# Patient Record
Sex: FEMALE | Race: BLACK OR AFRICAN AMERICAN | NOT HISPANIC OR LATINO | Employment: FULL TIME | RURAL
[De-identification: names, ages, dates, MRNs, and addresses within clinical notes are randomized per-mention and may not be internally consistent; named-entity substitution may affect disease eponyms.]

---

## 2020-06-16 ENCOUNTER — LAB (OUTPATIENT)
Dept: LAB | Facility: HOSPITAL | Age: 45
End: 2020-06-16

## 2020-06-16 ENCOUNTER — OFFICE VISIT (OUTPATIENT)
Dept: FAMILY MEDICINE CLINIC | Facility: CLINIC | Age: 45
End: 2020-06-16

## 2020-06-16 VITALS — BODY MASS INDEX: 32.96 KG/M2 | HEIGHT: 67 IN | WEIGHT: 210 LBS

## 2020-06-16 DIAGNOSIS — R25.2 LEG CRAMPS: ICD-10-CM

## 2020-06-16 DIAGNOSIS — F17.200 SMOKER: ICD-10-CM

## 2020-06-16 DIAGNOSIS — Z87.09 HISTORY OF ASTHMA: ICD-10-CM

## 2020-06-16 DIAGNOSIS — R10.9 ABDOMINAL CRAMPING: ICD-10-CM

## 2020-06-16 DIAGNOSIS — Z78.9 ALCOHOL USE: ICD-10-CM

## 2020-06-16 DIAGNOSIS — R53.1 WEAKNESS: ICD-10-CM

## 2020-06-16 DIAGNOSIS — R19.5 LOOSE STOOLS: ICD-10-CM

## 2020-06-16 DIAGNOSIS — Z91.89 HISTORY OF FAINTING: Primary | ICD-10-CM

## 2020-06-16 DIAGNOSIS — Z98.84 HISTORY OF GASTRIC BYPASS: ICD-10-CM

## 2020-06-16 DIAGNOSIS — R11.0 NAUSEA: ICD-10-CM

## 2020-06-16 PROCEDURE — 81001 URINALYSIS AUTO W/SCOPE: CPT | Performed by: NURSE PRACTITIONER

## 2020-06-16 PROCEDURE — 83735 ASSAY OF MAGNESIUM: CPT | Performed by: NURSE PRACTITIONER

## 2020-06-16 PROCEDURE — 99213 OFFICE O/P EST LOW 20 MIN: CPT | Performed by: NURSE PRACTITIONER

## 2020-06-16 PROCEDURE — 87088 URINE BACTERIA CULTURE: CPT | Performed by: NURSE PRACTITIONER

## 2020-06-16 PROCEDURE — 80053 COMPREHEN METABOLIC PANEL: CPT | Performed by: NURSE PRACTITIONER

## 2020-06-16 PROCEDURE — 85025 COMPLETE CBC W/AUTO DIFF WBC: CPT | Performed by: NURSE PRACTITIONER

## 2020-06-16 PROCEDURE — 87186 SC STD MICRODIL/AGAR DIL: CPT | Performed by: NURSE PRACTITIONER

## 2020-06-16 PROCEDURE — 83540 ASSAY OF IRON: CPT | Performed by: NURSE PRACTITIONER

## 2020-06-16 PROCEDURE — 87086 URINE CULTURE/COLONY COUNT: CPT | Performed by: NURSE PRACTITIONER

## 2020-06-16 PROCEDURE — 82607 VITAMIN B-12: CPT | Performed by: NURSE PRACTITIONER

## 2020-06-16 PROCEDURE — 82746 ASSAY OF FOLIC ACID SERUM: CPT | Performed by: NURSE PRACTITIONER

## 2020-06-16 PROCEDURE — 84466 ASSAY OF TRANSFERRIN: CPT | Performed by: NURSE PRACTITIONER

## 2020-06-16 PROCEDURE — 85007 BL SMEAR W/DIFF WBC COUNT: CPT | Performed by: NURSE PRACTITIONER

## 2020-06-16 RX ORDER — ALBUTEROL SULFATE 90 UG/1
2 AEROSOL, METERED RESPIRATORY (INHALATION) EVERY 4 HOURS PRN
COMMUNITY
End: 2020-06-22 | Stop reason: SDUPTHER

## 2020-06-16 RX ORDER — ONDANSETRON 8 MG/1
8 TABLET, ORALLY DISINTEGRATING ORAL EVERY 8 HOURS PRN
Qty: 30 TABLET | Refills: 0 | Status: SHIPPED | OUTPATIENT
Start: 2020-06-16 | End: 2020-08-26 | Stop reason: SDUPTHER

## 2020-06-16 RX ORDER — DICYCLOMINE HCL 20 MG
20 TABLET ORAL 3 TIMES DAILY PRN
Qty: 30 TABLET | Refills: 0 | Status: SHIPPED | OUTPATIENT
Start: 2020-06-16 | End: 2020-11-05

## 2020-06-16 NOTE — PROGRESS NOTES
"Subjective   Candy Hernandez is a 45 y.o. female.      You have chosen to receive care through a telephone visit. Do you consent to use a telephone visit for your medical care today?YES    PCP: none--needs to establish with PCP    CC: \"nausea, diarrhea, weakness\"    Patient reports moving to area over a year ago and has had no medical care since that time.  Had gastric bypass performed in [place] in 2017 and hasn't kept follow up appointments.  Takes a MVI, but no other supplements.  Has been under increased emotional stress and has been drinking more heavily over the last year--3-4 drinks/day.  Denies known history of liver problems, but has noted some discoloration in eyes.  Also reports known history of iron deficiency and became lightheaded and passed out at work two days ago, but had no immediate follow up.  Still feeling weak, but has not passed out again.  Has not returned to work and needing a work excuse.  Reports history of asthma and has Albuterol inhaler that she uses as needed.  Smoker of <1/2 ppd.  C/O frequent headaches, especially in the AM, but ease as the day goes on.  C/O intermittent nausea/stomach cramps/loose stools.  Has been having intense leg cramps with some tingling at night as well.  Urine is dark and cloudy, but no dysuria.  Has been tested for Covid x 3 and all tests have been negative.  Last test was about a month ago and has had no change in symptoms.     Leg Pain    The incident occurred more than 1 week ago. The incident occurred at home (severe cramping at night). The injury mechanism is unknown. Pain location: bilateral legs. The quality of the pain is described as cramping. The pain is moderate. The pain has been fluctuating since onset. Associated symptoms include muscle weakness and tingling. Pertinent negatives include no inability to bear weight, loss of motion, loss of sensation or numbness. She has tried nothing for the symptoms.   Nausea   This is a new problem. The " "current episode started more than 1 month ago. The problem occurs daily. The problem has been unchanged. Associated symptoms include abdominal pain (cramping), a change in bowel habit (more frequent loose stools), fatigue, headaches, myalgias (leg cramps), nausea, urinary symptoms (dark, cloudy urine) and weakness. Pertinent negatives include no anorexia, arthralgias, chest pain, chills, congestion, coughing, diaphoresis, fever, joint swelling, neck pain, numbness, rash, sore throat, swollen glands, vertigo, visual change or vomiting. She has tried nothing for the symptoms.        The following portions of the patient's history were reviewed and updated as appropriate: allergies, current medications, past medical history, past social history, past surgical history and problem list.    Review of Systems   Constitutional: Positive for appetite change and fatigue. Negative for chills, diaphoresis and fever.   HENT: Negative for congestion, sore throat and swollen glands.    Eyes: Negative.    Respiratory: Negative.  Negative for cough.    Cardiovascular: Negative.  Negative for chest pain.   Gastrointestinal: Positive for abdominal pain (cramping), change in bowel habit (more frequent loose stools), diarrhea and nausea. Negative for anorexia, blood in stool, constipation, vomiting, GERD and indigestion.   Genitourinary: Negative for difficulty urinating, dysuria, frequency and urgency.        +dark urine; cloudy     Musculoskeletal: Positive for myalgias (leg cramps). Negative for arthralgias, joint swelling and neck pain.   Skin: Negative for rash.   Neurological: Positive for tingling, syncope (passed out x 1 two days ago), weakness, light-headedness and headache. Negative for vertigo and numbness.     Ht 170.2 cm (67\")   Wt 95.3 kg (210 lb)   LMP 05/13/2020   Breastfeeding No   BMI 32.89 kg/m²     Objective   Physical Exam   Constitutional: She is oriented to person, place, and time.   Neurological: She is alert " and oriented to person, place, and time.   Psychiatric: She has a normal mood and affect. Her behavior is normal. Thought content normal.     No results found for this or any previous visit (from the past 24 hour(s)).  No Images in the past 120 days found..      Assessment/Plan   Diagnoses and all orders for this visit:    History of fainting  -     CBC Auto Differential  -     Comprehensive metabolic panel  -     Urinalysis With Culture If Indicated -  -     Iron Profile  -     Vitamin B12  -     Folate  -     Magnesium  -     Gastrointestinal Panel, PCR - Stool, Per Rectum    Nausea  -     CBC Auto Differential  -     Comprehensive metabolic panel  -     Urinalysis With Culture If Indicated -  -     ondansetron ODT (Zofran ODT) 8 MG disintegrating tablet; Place 1 tablet on the tongue Every 8 (Eight) Hours As Needed for Nausea or Vomiting.    Loose stools  -     CBC Auto Differential  -     Comprehensive metabolic panel  -     Vitamin B12  -     Folate  -     Magnesium  -     Gastrointestinal Panel, PCR - Stool, Per Rectum    Leg cramps  -     CBC Auto Differential  -     Comprehensive metabolic panel  -     Iron Profile  -     Vitamin B12  -     Folate  -     Magnesium    History of gastric bypass  -     CBC Auto Differential  -     Comprehensive metabolic panel  -     Iron Profile  -     Vitamin B12  -     Folate  -     Magnesium    Weakness  -     CBC Auto Differential  -     Comprehensive metabolic panel  -     Iron Profile  -     Vitamin B12  -     Folate  -     Magnesium    Alcohol use  -     Comprehensive metabolic panel    Smoker    History of asthma    Abdominal cramping  -     CBC Auto Differential  -     Comprehensive metabolic panel  -     Gastrointestinal Panel, PCR - Stool, Per Rectum  -     dicyclomine (Bentyl) 20 MG tablet; Take 1 tablet by mouth 3 (Three) Times a Day As Needed (abdominal cramping).    Work up as above  Encouraged to limit alcohol, Tylenol products at this time pending lab  results  Encouraged hydration with fluids/electrolytes over the next few days  Out of work pending labs and f/u visit--RTW note provided  Appt to establish care and for f/u on above problems with SDARIEN Frey on 6/22/2020 @ 11 am    ER if worsening symptoms or any further fainting/syncopal episodes    See PCP for routine f/u visit and management of chronic medical conditions      This document has been electronically signed by DARIEN Gonsales on June 16, 2020 11:24,.    This visit has been rescheduled as a phone visit to comply with patient safety concerns in accordance with CDC recommendations. Total time of discussion was 13 minutes.

## 2020-06-16 NOTE — PATIENT INSTRUCTIONS
Syncope    Syncope refers to a condition in which a person temporarily loses consciousness. Syncope may also be called fainting or passing out. It is caused by a sudden decrease in blood flow to the brain. Even though most causes of syncope are not dangerous, syncope can be a sign of a serious medical problem. Your health care provider may do tests to find the reason why you are having syncope.  Signs that you may be about to faint include:  · Feeling dizzy or light-headed.  · Feeling nauseous.  · Seeing all white or all black in your field of vision.  · Having cold, clammy skin.  If you faint, get medical help right away. Call your local emergency services (911 in the [place] Do not drive yourself to the hospital.  Follow these instructions at home:  Pay attention to any changes in your symptoms. Take these actions to stay safe and to help relieve your symptoms:  Lifestyle  · Do not drive, use machinery, or play sports until your health care provider says it is okay.  · Do not drink alcohol.  · Do not use any products that contain nicotine or tobacco, such as cigarettes and e-cigarettes. If you need help quitting, ask your health care provider.  · Drink enough fluid to keep your urine pale yellow.  General instructions  · Take over-the-counter and prescription medicines only as told by your health care provider.  · If you are taking blood pressure or heart medicine, get up slowly and take several minutes to sit and then stand. This can reduce dizziness or light-headedness.  · Have someone stay with you until you feel stable.  · If you start to feel like you might faint, lie down right away and raise (elevate) your feet above the level of your heart. Breathe deeply and steadily. Wait until all the symptoms have passed.  · Keep all follow-up visits as told by your health care provider. This is important.  Get help right away if you:  · Have a severe headache.  · Faint once or repeatedly.  · Have pain in your chest,  abdomen, or back.  · Have a very fast or irregular heartbeat (palpitations).  · Have pain when you breathe.  · Are bleeding from your mouth or rectum, or you have black or tarry stool.  · Have a seizure.  · Are confused.  · Have trouble walking.  · Have severe weakness.  · Have vision problems.  These symptoms may represent a serious problem that is an emergency. Do not wait to see if your symptoms will go away. Get medical help right away. Call your local emergency services (911 in the [place] Do not drive yourself to the hospital.  Summary  · Syncope refers to a condition in which a person temporarily loses consciousness. It is caused by a sudden decrease in blood flow to the brain.  · Signs that you may be about to faint include dizziness, feeling light-headed, feeling nauseous, sudden vision changes, or cold, clammy skin.  · Although most causes of syncope are not dangerous, syncope can be a sign of a serious medical problem. If you faint, get medical help right away.  This information is not intended to replace advice given to you by your health care provider. Make sure you discuss any questions you have with your health care provider.  Document Released: 12/18/2006 Document Revised: 11/30/2018 Document Reviewed: 11/26/2018  [employer] Patient Education © 2020 [employer]

## 2020-06-17 LAB
ALBUMIN SERPL-MCNC: 4 G/DL (ref 3.5–5.2)
ALBUMIN/GLOB SERPL: 1.3 G/DL
ALP SERPL-CCNC: 68 U/L (ref 39–117)
ALT SERPL W P-5'-P-CCNC: 16 U/L (ref 1–33)
ANION GAP SERPL CALCULATED.3IONS-SCNC: 9.9 MMOL/L (ref 5–15)
ANISOCYTOSIS BLD QL: ABNORMAL
AST SERPL-CCNC: 19 U/L (ref 1–32)
BACTERIA UR QL AUTO: ABNORMAL /HPF
BILIRUB SERPL-MCNC: 0.4 MG/DL (ref 0.2–1.2)
BILIRUB UR QL STRIP: NEGATIVE
BUN BLD-MCNC: 15 MG/DL (ref 6–20)
BUN/CREAT SERPL: 20 (ref 7–25)
CALCIUM SPEC-SCNC: 8.7 MG/DL (ref 8.6–10.5)
CHLORIDE SERPL-SCNC: 106 MMOL/L (ref 98–107)
CLARITY UR: ABNORMAL
CO2 SERPL-SCNC: 24.1 MMOL/L (ref 22–29)
COLOR UR: YELLOW
CREAT BLD-MCNC: 0.75 MG/DL (ref 0.57–1)
DEPRECATED RDW RBC AUTO: 45.6 FL (ref 37–54)
ELLIPTOCYTES BLD QL SMEAR: ABNORMAL
EOSINOPHIL # BLD MANUAL: 0.09 10*3/MM3 (ref 0–0.4)
EOSINOPHIL NFR BLD MANUAL: 1 % (ref 0.3–6.2)
ERYTHROCYTE [DISTWIDTH] IN BLOOD BY AUTOMATED COUNT: 19.8 % (ref 12.3–15.4)
FOLATE SERPL-MCNC: 7.97 NG/ML (ref 4.78–24.2)
GFR SERPL CREATININE-BSD FRML MDRD: 100 ML/MIN/1.73
GFR SERPL CREATININE-BSD FRML MDRD: 83 ML/MIN/1.73
GLOBULIN UR ELPH-MCNC: 3 GM/DL
GLUCOSE BLD-MCNC: 82 MG/DL (ref 65–99)
GLUCOSE UR STRIP-MCNC: NEGATIVE MG/DL
HCT VFR BLD AUTO: 35.8 % (ref 34–46.6)
HGB BLD-MCNC: 10.4 G/DL (ref 12–15.9)
HGB UR QL STRIP.AUTO: NEGATIVE
HYALINE CASTS UR QL AUTO: ABNORMAL /LPF
HYPOCHROMIA BLD QL: ABNORMAL
IRON 24H UR-MRATE: 18 MCG/DL (ref 37–145)
IRON SATN MFR SERPL: 3 % (ref 20–50)
KETONES UR QL STRIP: ABNORMAL
LEUKOCYTE ESTERASE UR QL STRIP.AUTO: ABNORMAL
LYMPHOCYTES # BLD MANUAL: 3.83 10*3/MM3 (ref 0.7–3.1)
LYMPHOCYTES NFR BLD MANUAL: 43.3 % (ref 19.6–45.3)
LYMPHOCYTES NFR BLD MANUAL: 6.2 % (ref 5–12)
MAGNESIUM SERPL-MCNC: 2.1 MG/DL (ref 1.6–2.6)
MCH RBC QN AUTO: 19.7 PG (ref 26.6–33)
MCHC RBC AUTO-ENTMCNC: 29.1 G/DL (ref 31.5–35.7)
MCV RBC AUTO: 67.9 FL (ref 79–97)
MICROCYTES BLD QL: ABNORMAL
MONOCYTES # BLD AUTO: 0.55 10*3/MM3 (ref 0.1–0.9)
NEUTROPHILS # BLD AUTO: 4.1 10*3/MM3 (ref 1.7–7)
NEUTROPHILS NFR BLD MANUAL: 46.4 % (ref 42.7–76)
NITRITE UR QL STRIP: POSITIVE
PH UR STRIP.AUTO: 5.5 [PH] (ref 5–8)
PLAT MORPH BLD: NORMAL
PLATELET # BLD AUTO: 332 10*3/MM3 (ref 140–450)
POIKILOCYTOSIS BLD QL SMEAR: ABNORMAL
POLYCHROMASIA BLD QL SMEAR: ABNORMAL
POTASSIUM BLD-SCNC: 3.9 MMOL/L (ref 3.5–5.2)
PROT SERPL-MCNC: 7 G/DL (ref 6–8.5)
PROT UR QL STRIP: ABNORMAL
RBC # BLD AUTO: 5.27 10*6/MM3 (ref 3.77–5.28)
RBC # UR: ABNORMAL /HPF
REF LAB TEST METHOD: ABNORMAL
SODIUM BLD-SCNC: 140 MMOL/L (ref 136–145)
SP GR UR STRIP: 1.03 (ref 1–1.03)
SPHEROCYTES BLD QL SMEAR: ABNORMAL
SQUAMOUS #/AREA URNS HPF: ABNORMAL /HPF
TIBC SERPL-MCNC: 593 MCG/DL (ref 298–536)
TRANSFERRIN SERPL-MCNC: 398 MG/DL (ref 200–360)
UROBILINOGEN UR QL STRIP: ABNORMAL
VARIANT LYMPHS NFR BLD MANUAL: 3.1 % (ref 0–5)
VIT B12 BLD-MCNC: 320 PG/ML (ref 211–946)
WBC MORPH BLD: NORMAL
WBC NRBC COR # BLD: 8.84 10*3/MM3 (ref 3.4–10.8)
WBC UR QL AUTO: ABNORMAL /HPF

## 2020-06-18 DIAGNOSIS — E61.1 LOW IRON: Primary | ICD-10-CM

## 2020-06-18 DIAGNOSIS — N39.0 ACUTE UTI: ICD-10-CM

## 2020-06-18 RX ORDER — FERROUS SULFATE 325(65) MG
325 TABLET ORAL
Qty: 30 TABLET | Refills: 0 | Status: SHIPPED | OUTPATIENT
Start: 2020-06-18 | End: 2020-06-25

## 2020-06-18 RX ORDER — FLUCONAZOLE 150 MG/1
TABLET ORAL
Qty: 2 TABLET | Refills: 0 | Status: SHIPPED | OUTPATIENT
Start: 2020-06-18 | End: 2020-06-25

## 2020-06-18 RX ORDER — SULFAMETHOXAZOLE AND TRIMETHOPRIM 800; 160 MG/1; MG/1
1 TABLET ORAL 2 TIMES DAILY
Qty: 20 TABLET | Refills: 0 | Status: SHIPPED | OUTPATIENT
Start: 2020-06-18 | End: 2020-06-28

## 2020-06-19 LAB — BACTERIA SPEC AEROBE CULT: ABNORMAL

## 2020-06-22 ENCOUNTER — OFFICE VISIT (OUTPATIENT)
Dept: FAMILY MEDICINE CLINIC | Facility: CLINIC | Age: 45
End: 2020-06-22

## 2020-06-22 VITALS
SYSTOLIC BLOOD PRESSURE: 132 MMHG | WEIGHT: 227.8 LBS | DIASTOLIC BLOOD PRESSURE: 80 MMHG | TEMPERATURE: 98.3 F | OXYGEN SATURATION: 97 % | RESPIRATION RATE: 16 BRPM | HEART RATE: 86 BPM | HEIGHT: 67 IN | BODY MASS INDEX: 35.75 KG/M2

## 2020-06-22 DIAGNOSIS — Z76.89 ENCOUNTER TO ESTABLISH CARE: Primary | ICD-10-CM

## 2020-06-22 DIAGNOSIS — Z12.39 SCREENING FOR BREAST CANCER: ICD-10-CM

## 2020-06-22 DIAGNOSIS — R25.2 BILATERAL LEG CRAMPS: ICD-10-CM

## 2020-06-22 DIAGNOSIS — D50.9 IRON DEFICIENCY ANEMIA, UNSPECIFIED IRON DEFICIENCY ANEMIA TYPE: ICD-10-CM

## 2020-06-22 DIAGNOSIS — K21.9 GASTROESOPHAGEAL REFLUX DISEASE, ESOPHAGITIS PRESENCE NOT SPECIFIED: ICD-10-CM

## 2020-06-22 DIAGNOSIS — Z87.09 HISTORY OF ASTHMA: ICD-10-CM

## 2020-06-22 PROCEDURE — 99203 OFFICE O/P NEW LOW 30 MIN: CPT | Performed by: NURSE PRACTITIONER

## 2020-06-22 RX ORDER — MELATONIN
1000 DAILY
COMMUNITY

## 2020-06-22 RX ORDER — ALBUTEROL SULFATE 90 UG/1
2 AEROSOL, METERED RESPIRATORY (INHALATION) EVERY 4 HOURS PRN
Qty: 1 INHALER | Refills: 2 | Status: SHIPPED | OUTPATIENT
Start: 2020-06-22 | End: 2020-12-02

## 2020-06-22 RX ORDER — OMEPRAZOLE 40 MG/1
40 CAPSULE, DELAYED RELEASE ORAL DAILY
Qty: 30 CAPSULE | Refills: 0 | Status: SHIPPED | OUTPATIENT
Start: 2020-06-22 | End: 2020-07-22

## 2020-06-22 NOTE — PROGRESS NOTES
"Chief Complaint   Patient presents with   • [facility]     chronic nausea and diarrhea        Subjective:  Candy Hernandez is a 45 y.o. female who presents to [facility]. Reports recurrent intermittent nausea x 2 weeks. Initially had diarrhea but reports that has resolved. Was seen in walk-in clinic last week and prescribed zofran which she reports is helping. Just started Bactrim for UTI yesterday and iron supplement for anemia. Has appt with hem/onc this Thursday 25th. Also reports bilat leg cramps and mild tingling mainly at night. Reports she does not drink enough water and has increased consumption of \"Alon and Coke\" at least daily over the past year. Reports weight loss surgery that was performed in [place] in 2017 but moved here and never followed up with surgeon afterwards. Reports UGI and colonoscopy performed before weight loss surgery in 2017 d/t rectal bleeding. No records to review today.       The following portions of the patient's history were reviewed and updated as appropriate: allergies, current medications, past family history, past medical history, past social history, past surgical history and problem list.    Nausea   This is a new problem. The current episode started 1 to 4 weeks ago. The problem occurs intermittently. The problem has been waxing and waning. Associated symptoms include abdominal pain, myalgias, nausea and urinary symptoms. Pertinent negatives include no change in bowel habit, chest pain, coughing, fatigue, fever, headaches or rash. Nothing aggravates the symptoms. Treatments tried: zofran. The treatment provided moderate relief.   Urinary Tract Infection    This is a new problem. The current episode started in the past 7 days. The problem has been unchanged. The quality of the pain is described as aching. There has been no fever. Associated symptoms include frequency, nausea and urgency. Treatments tried: Bactrim x 2 days. The treatment provided moderate relief. " There is no history of recurrent UTIs.   Leg Pain    There was no injury mechanism. The pain is present in the left leg and right leg (cramping that is worse at night). The quality of the pain is described as cramping. The pain is at a severity of 7/10. The pain is moderate. The pain has been intermittent since onset. Associated symptoms include tingling (right foot at times). Pertinent negatives include no inability to bear weight, loss of motion, loss of sensation or muscle weakness. Nothing aggravates the symptoms. She has tried rest for the symptoms. The treatment provided no relief.        Past Medical History:   Diagnosis Date   • Asthma    • Sleep apnea          Current Outpatient Medications:   •  albuterol sulfate  (90 Base) MCG/ACT inhaler, Inhale 2 puffs Every 4 (Four) Hours As Needed for Wheezing or Shortness of Air., Disp: 1 inhaler, Rfl: 2  •  dicyclomine (Bentyl) 20 MG tablet, Take 1 tablet by mouth 3 (Three) Times a Day As Needed (abdominal cramping)., Disp: 30 tablet, Rfl: 0  •  ferrous sulfate 325 (65 FE) MG tablet, Take 1 tablet by mouth Daily With Breakfast., Disp: 30 tablet, Rfl: 0  •  fluconazole (Diflucan) 150 MG tablet, 1 tab po x 1 now, may repeat in 4 days prn yeast after taking antibiotics, Disp: 2 tablet, Rfl: 0  •  Multiple Vitamin (MULTI VITAMIN DAILY PO), Take  by mouth., Disp: , Rfl:   •  ondansetron ODT (Zofran ODT) 8 MG disintegrating tablet, Place 1 tablet on the tongue Every 8 (Eight) Hours As Needed for Nausea or Vomiting., Disp: 30 tablet, Rfl: 0  •  sulfamethoxazole-trimethoprim (BACTRIM DS,SEPTRA DS) 800-160 MG per tablet, Take 1 tablet by mouth 2 (Two) Times a Day for 10 days., Disp: 20 tablet, Rfl: 0  •  Cholecalciferol (VITAMIN D3) 1.25 MG (32154 UT) capsule, , Disp: , Rfl:   •  Fluticasone Propionate, Inhal, (Flovent Diskus) 250 MCG/BLIST aerosol powder , Inhale 1 puff Every 12 (Twelve) Hours., Disp: , Rfl:   •  omeprazole (PrilOSEC) 40 MG capsule, Take 1 capsule by  "mouth Daily., Disp: 30 capsule, Rfl: 0  •  sodium chloride 0.9 % nebulizer solution 0.74 mL with albuterol (5 MG/ML) 0.5% nebulizer solution 1.3 mg, Inhale 3 mL Every 8 (Eight) Hours., Disp: , Rfl:     Review of Systems    Review of Systems   Constitutional: Positive for unexpected weight change. Negative for activity change, appetite change, fatigue and fever.   Respiratory: Negative for cough and chest tightness.    Cardiovascular: Negative for chest pain.   Gastrointestinal: Positive for abdominal pain and nausea. Negative for change in bowel habit.        Currently taking Zofran for nausea that helps relieve sx   Endocrine: Negative for cold intolerance, heat intolerance, polydipsia, polyphagia and polyuria.   Genitourinary: Positive for dysuria, frequency and urgency.        Currently on Bactrim for UTI    Musculoskeletal: Positive for myalgias.   Skin: Negative for rash.   Allergic/Immunologic: Negative for immunocompromised state.   Neurological: Positive for tingling (right foot at times). Negative for dizziness, syncope, light-headedness and headaches.   Hematological: Negative for adenopathy. Does not bruise/bleed easily.       Objective  Vitals:    06/22/20 1101   BP: 132/80   BP Location: Right arm   Patient Position: Sitting   Cuff Size: Adult   Pulse: 86   Resp: 16   Temp: 98.3 °F (36.8 °C)   TempSrc: Oral   SpO2: 97%   Weight: 103 kg (227 lb 12.8 oz)   Height: 170.2 cm (67\")   PainSc:   7   PainLoc: Leg       Physical Exam   Constitutional: She is oriented to person, place, and time. She appears well-developed and well-nourished. No distress.   HENT:   Head: Normocephalic and atraumatic.   Wearing face mask d/t pandemic   Eyes: Pupils are equal, round, and reactive to light. Conjunctivae are normal.   Neck: Normal range of motion. Neck supple.   Cardiovascular: Normal rate, regular rhythm and normal heart sounds.   Pulmonary/Chest: Effort normal and breath sounds normal.   Abdominal: Soft. Bowel " sounds are normal. She exhibits no distension and no mass. There is tenderness (epigastric area ttp). There is no rebound and no guarding.   Musculoskeletal: Normal range of motion. She exhibits no tenderness.   Neurological: She is alert and oriented to person, place, and time.   Skin: Skin is warm and dry.   Psychiatric: She has a normal mood and affect. Her behavior is normal. Judgment and thought content normal.   Nursing note and vitals reviewed.        Candy was seen today for establish care.    Diagnoses and all orders for this visit:    Encounter to establish care    Iron deficiency anemia, unspecified iron deficiency anemia type    Gastroesophageal reflux disease, esophagitis presence not specified  -     omeprazole (PrilOSEC) 40 MG capsule; Take 1 capsule by mouth Daily.    History of asthma  -     albuterol sulfate  (90 Base) MCG/ACT inhaler; Inhale 2 puffs Every 4 (Four) Hours As Needed for Wheezing or Shortness of Air.    Bilateral leg cramps    Screening for breast cancer  -     Mammo Screening Bilateral With CAD; Future    1. Est care visit - no past medical records to review - pt to sign for records today.  2. Will start on 30 days of omeprazole for persistent nausea. Cont zofran as needed.  3. Refill of albuterol provided for asthma. Is smoker approx .25 ppd.  4. Bilat leg cramps - reviewed and discussed lab results. Advised to increase oral water intake, electrolyte replacement once daily. Will re-eval at f/u appt.  5. Iron def anemia - has started iron supp. Has appt with hem/onc this week. Appt date/time provided to pt.  6. RTC 1 mo for f/u. If nausea still present, will refer to GI for further eval. Pt may need note releasing back to work if not able to obtain from appt on Thursday.       This document has been electronically signed by DARIEN Sharma on June 22, 2020 12:01

## 2020-06-25 ENCOUNTER — LAB (OUTPATIENT)
Dept: ONCOLOGY | Facility: HOSPITAL | Age: 45
End: 2020-06-25

## 2020-06-25 ENCOUNTER — CONSULT (OUTPATIENT)
Dept: ONCOLOGY | Facility: CLINIC | Age: 45
End: 2020-06-25

## 2020-06-25 VITALS
BODY MASS INDEX: 35.68 KG/M2 | DIASTOLIC BLOOD PRESSURE: 86 MMHG | WEIGHT: 227.8 LBS | TEMPERATURE: 98.1 F | SYSTOLIC BLOOD PRESSURE: 158 MMHG | HEART RATE: 84 BPM | RESPIRATION RATE: 18 BRPM

## 2020-06-25 DIAGNOSIS — D50.9 IRON DEFICIENCY ANEMIA, UNSPECIFIED IRON DEFICIENCY ANEMIA TYPE: Primary | ICD-10-CM

## 2020-06-25 DIAGNOSIS — Z98.84 HISTORY OF GASTRIC BYPASS: ICD-10-CM

## 2020-06-25 DIAGNOSIS — D50.9 IRON DEFICIENCY ANEMIA, UNSPECIFIED IRON DEFICIENCY ANEMIA TYPE: ICD-10-CM

## 2020-06-25 DIAGNOSIS — Z71.6 ENCOUNTER FOR TOBACCO USE CESSATION COUNSELING: ICD-10-CM

## 2020-06-25 DIAGNOSIS — F41.9 ANXIETY: Primary | ICD-10-CM

## 2020-06-25 DIAGNOSIS — F32.A DEPRESSION, UNSPECIFIED DEPRESSION TYPE: ICD-10-CM

## 2020-06-25 DIAGNOSIS — J45.909 ASTHMA, UNSPECIFIED ASTHMA SEVERITY, UNSPECIFIED WHETHER COMPLICATED, UNSPECIFIED WHETHER PERSISTENT: ICD-10-CM

## 2020-06-25 PROCEDURE — 99406 BEHAV CHNG SMOKING 3-10 MIN: CPT | Performed by: INTERNAL MEDICINE

## 2020-06-25 PROCEDURE — G0463 HOSPITAL OUTPT CLINIC VISIT: HCPCS | Performed by: INTERNAL MEDICINE

## 2020-06-25 PROCEDURE — 99204 OFFICE O/P NEW MOD 45 MIN: CPT | Performed by: INTERNAL MEDICINE

## 2020-06-25 RX ORDER — DIPHENHYDRAMINE HYDROCHLORIDE 50 MG/ML
50 INJECTION INTRAMUSCULAR; INTRAVENOUS AS NEEDED
Status: CANCELLED | OUTPATIENT
Start: 2020-07-02

## 2020-06-25 RX ORDER — MAGNESIUM 200 MG
1000 TABLET ORAL DAILY
Qty: 90 EACH | Refills: 3 | Status: SHIPPED | OUTPATIENT
Start: 2020-06-25

## 2020-06-25 RX ORDER — DIPHENHYDRAMINE HCL 25 MG
25 CAPSULE ORAL ONCE
Status: CANCELLED | OUTPATIENT
Start: 2020-07-02

## 2020-06-25 RX ORDER — ACETAMINOPHEN 325 MG/1
650 TABLET ORAL ONCE
Status: CANCELLED | OUTPATIENT
Start: 2020-07-02

## 2020-06-25 RX ORDER — SODIUM CHLORIDE 9 MG/ML
250 INJECTION, SOLUTION INTRAVENOUS ONCE
Status: CANCELLED | OUTPATIENT
Start: 2020-07-02

## 2020-06-25 RX ORDER — FOLIC ACID 1 MG/1
1 TABLET ORAL DAILY
Qty: 90 TABLET | Refills: 3 | Status: SHIPPED | OUTPATIENT
Start: 2020-06-25

## 2020-06-25 NOTE — PROGRESS NOTES
Candy Hernandez  [id number]  1975      REASON FOR CONSULTATION:  Iron deficiency anemia   Provide an opinion on any further workup or treatment                             REQUESTING PHYSICIAN:  Eladio Drummond APRN    RECORDS OBTAINED:  Records of the patients history including those obtained from the referring provider were reviewed and summarized in detail.      History of Present Illness     This is a pleasant 45-year-old female who was seen in consultation at the request of Eladio Drummond APRN for evaluation of iron deficiency anemia.  Patient unfortunately is a very poor historian most of the history was obtained by reviewing old medical records.  Patient had routine laboratory testing performed by her primary care provider on June 16, 2020 which showed severe iron deficiency anemia indicated by hemoglobin of 10.4, MCV of 67.9 and markedly low iron saturation of 3.  Her B12 and folic acid was also in low normal range -folic acid at 7.97 and vitamin B12 at 320.  Patient denies any prior history of anemia.  She does report history of Radha-en-Y gastric bypass surgery in 2017 for weight loss.  She denies any prior history of IV iron infusion or blood transfusion.  She has been taking oral iron supplement however has developed GI side effects -stomach upset and constipation.  She denies any bright red blood per rectum or melena.  Denies any family history of any hematological disorder.  I been asked to assist with evaluation management of her iron deficiency anemia.      Past Medical History:   Diagnosis Date   • Anemia    • Asthma    • GERD (gastroesophageal reflux disease)    • Sleep apnea         Past Surgical History:   Procedure Laterality Date   • BARIATRIC SURGERY  05/05/2017   • TUBAL ABDOMINAL LIGATION          Current Outpatient Medications on File Prior to Visit   Medication Sig Dispense Refill   • albuterol sulfate  (90 Base) MCG/ACT inhaler Inhale 2 puffs Every 4 (Four) Hours As Needed for  Wheezing or Shortness of Air. 1 inhaler 2   • Cholecalciferol (VITAMIN D3) 1.25 MG (87539 UT) capsule      • dicyclomine (Bentyl) 20 MG tablet Take 1 tablet by mouth 3 (Three) Times a Day As Needed (abdominal cramping). 30 tablet 0   • Fluticasone Propionate, Inhal, (Flovent Diskus) 250 MCG/BLIST aerosol powder  Inhale 1 puff Every 12 (Twelve) Hours.     • Multiple Vitamin (MULTI VITAMIN DAILY PO) Take  by mouth.     • omeprazole (PrilOSEC) 40 MG capsule Take 1 capsule by mouth Daily. 30 capsule 0   • ondansetron ODT (Zofran ODT) 8 MG disintegrating tablet Place 1 tablet on the tongue Every 8 (Eight) Hours As Needed for Nausea or Vomiting. 30 tablet 0   • sodium chloride 0.9 % nebulizer solution 0.74 mL with albuterol (5 MG/ML) 0.5% nebulizer solution 1.3 mg Inhale 3 mL Every 8 (Eight) Hours.     • sulfamethoxazole-trimethoprim (BACTRIM DS,SEPTRA DS) 800-160 MG per tablet Take 1 tablet by mouth 2 (Two) Times a Day for 10 days. 20 tablet 0   • [DISCONTINUED] ferrous sulfate 325 (65 FE) MG tablet Take 1 tablet by mouth Daily With Breakfast. 30 tablet 0   • [DISCONTINUED] fluconazole (Diflucan) 150 MG tablet 1 tab po x 1 now, may repeat in 4 days prn yeast after taking antibiotics 2 tablet 0     No current facility-administered medications on file prior to visit.         ALLERGIES:    Allergies   Allergen Reactions   • Nsaids Other (See Comments)     Hx of bariatric surgery        Social History     Socioeconomic History   • Marital status: Single     Spouse name: Not on file   • Number of children: Not on file   • Years of education: Not on file   • Highest education level: Not on file   Tobacco Use   • Smoking status: Current Every Day Smoker     Packs/day: 0.25     Types: Cigarettes   • Smokeless tobacco: Never Used   Substance and Sexual Activity   • Alcohol use: Yes     Frequency: 4 or more times a week     Drinks per session: 1 or 2   • Drug use: Defer   • Sexual activity: Defer        No family history on file.      Review of Systems       CONSTITUTIONAL: fatigue + weakness + No weight loss, fever, chills.  HEENT: Eyes: No visual loss, blurred vision, double vision or yellow sclerae. Ears, Nose, Throat: No hearing loss, sneezing, congestion, runny nose or sore throat.  SKIN: No rash or itching.  CARDIOVASCULAR: No chest pain, chest pressure or chest discomfort. No palpitations or edema.  RESPIRATORY: No shortness of breath, cough or sputum.  GASTROINTESTINAL: No anorexia, nausea, vomiting or diarrhea. No abdominal pain or blood.  GENITOURINARY: Negative for urgency, frequency or dysuria.   NEUROLOGICAL: No headache, dizziness, syncope, paralysis, ataxia, numbness or tingling in the extremities. No change in bowel or bladder control.  MUSCULOSKELETAL: chronic joint pain + low back pain +   HEMATOLOGIC: No anemia, bleeding or bruising.  LYMPHATICS: No enlarged nodes. No history of splenectomy.  PSYCHIATRIC: No history of depression or anxiety.  ENDOCRINOLOGIC: No reports of sweating, cold or heat intolerance. No polyuria or polydipsia.  ALLERGIES: asthma + No history of  hives, eczema or rhinitis.      Objective     Vitals:    06/25/20 1435   BP: 158/86   Pulse: 84   Resp: 18   Temp: 98.1 °F (36.7 °C)   Weight: 103 kg (227 lb 12.8 oz)   PainSc:   8   PainLoc: Back     Current Status 6/25/2020   ECOG score 0       Physical Exam      GENERAL: Alert, awake, oriented.  Well dressed.  Not in apparent distress. Vitals as above.   HEAD: Normocephalic, atraumatic.   EYES: PERRL, EOMI.  vision is grossly intact.  Conjunctival pallor +   NECK: Supple, no adenopathy or thyromegaly.   THROAT: Normal oral cavity and pharynx. No inflammation, swelling, exudate, or lesions.  CARDIAC: Normal S1 and S2. No S3, S4 or murmurs. Rhythm is regular.  Extremities are warm and well perfused.   LUNGS: Clear to auscultation and percussion without rales, rhonchi, wheezing or diminished breath sounds.  ABDOMEN: Positive bowel sounds. Soft, obese + ,  nontender. No guarding or rebound. No masses.  BACK:  No bony tenderness.   EXTREMITIES: No significant deformity or joint abnormality.  Peripheral pulses intact. No varicosities.  SKIN: No rash or bruising.  NEUROLOGICAL: Grossly non-focal exam. No focal weakness. Gait: Normal.   PSYCH: Mood and affect normal. No hallucination or suicidal thoughts.   LYMPHATIC: No cervical, supraclavicular or axillary adenopathy.       RECENT LABS: Independently reviewed and summarized  Hematology WBC   Date Value Ref Range Status   06/16/2020 8.84 3.40 - 10.80 10*3/mm3 Final     RBC   Date Value Ref Range Status   06/16/2020 5.27 3.77 - 5.28 10*6/mm3 Final     Hemoglobin   Date Value Ref Range Status   06/16/2020 10.4 (L) 12.0 - 15.9 g/dL Final     Hematocrit   Date Value Ref Range Status   06/16/2020 35.8 34.0 - 46.6 % Final     Platelets   Date Value Ref Range Status   06/16/2020 332 140 - 450 10*3/mm3 Final        I have reviewed old records and summarized them in HPI as well as assessment and plan section of this note.     Candy Hernandez reports a pain score of 8.  Given her pain assessment as noted, treatment options were discussed and the following options were decided upon as a follow-up plan to address the patient's pain: referral to Primary Care for assistance in pain treatment guidance.    PHQ-9 Total Score: 0  Depression screen negative.     Diagnosis:   (1) Iron deficiency anemia   (2) History of gastric bypass   (3) Asthma   (4) Encounter for tobacco use cessation counseling     All are new diagnosis/problems for me.     Assessment/Plan     (1) Iron deficiency anemia (2) History of gastric bypass     This is a very pleasant 45 year old female who had Radha-en-Y gastric bypass surgery in year 2017 and has developed severe iron deficiency anemia in spite of being on oral iron supplements.    Iron deficiency is one of the most common nutritional problems following bariatric surgery and results in hypochromic and  microcytic anemia. Iron is primarily absorbed in the duodenum and proximal jejunum; bariatric bypass operations, namely RYGB and BPD/DS, exacerbate this problem. In addition, dietary iron is commonly bound to protein and cleaved by the action of gastric acid in the stomach. Iron deficiency is identified in 0 to 58 percent of patients with obesity preoperatively and 8 to 50 percent of postoperative bariatric patients, particularly in women who are still menstruating.     I had an extensive discussion with patient about diagnosis and treatment options. I recommend that we replaced her iron with IV injectofer 750 mg, 2 infusions, one week apartwe. I also recommend that we should check  iron studies every 3 months after this initial replacement and consider IV iron treatment for ferritin drops to less than 50 or transference saturation dropped to less than 20%.  In addition, She should also be checked for vitamin B12 level at least twice a year.  She should be on oral multivitamin and mineral supplements which are available over-the-counter.    I had an extensive discussion with her about risk versus benefits of IV iron treatment.    I discussed about various risks associated with IV iron such as allergic reaction, hypersensitivity reaction, headache, flushing, joint aches or pains, local IV infiltration and skin discoloration.  After our discussion patient was in agreement and proceeding with IV iron treatment for  Anemia.    Prescription of sublingual B12 1000 mcg and folic acid 1 mg daily also sent to her pharmacy.    (3) Asthma: Stable on PRN albuterol.     (4) Encounter for tobacco use cessation counseling: I had 5-7 minutes discussion with the patient about smoking cessation. Problems with continued smoking including respiratory, cardiovascular and oncological problems were discussion. Advice on smoking cessation was reiterated including methods of quitting and different medications and support system available  for smoking cessation was discussed.   Patient understood and was agreeable.     Patient requested work excuse letter which was given today.        Thank you for involving me in Ms Hernandez' care.     Please call us if any questions/concerns.     Nicholas Clement MD   Hematology Oncology

## 2020-07-02 ENCOUNTER — INFUSION (OUTPATIENT)
Dept: ONCOLOGY | Facility: HOSPITAL | Age: 45
End: 2020-07-02

## 2020-07-02 VITALS
HEART RATE: 80 BPM | SYSTOLIC BLOOD PRESSURE: 141 MMHG | TEMPERATURE: 97.8 F | DIASTOLIC BLOOD PRESSURE: 82 MMHG | RESPIRATION RATE: 18 BRPM

## 2020-07-02 DIAGNOSIS — D50.9 IRON DEFICIENCY ANEMIA, UNSPECIFIED IRON DEFICIENCY ANEMIA TYPE: Primary | ICD-10-CM

## 2020-07-02 DIAGNOSIS — Z98.84 HISTORY OF GASTRIC BYPASS: ICD-10-CM

## 2020-07-02 PROCEDURE — 96374 THER/PROPH/DIAG INJ IV PUSH: CPT | Performed by: INTERNAL MEDICINE

## 2020-07-02 PROCEDURE — 25010000002 FERRIC CARBOXYMALTOSE 750 MG/15ML SOLUTION 15 ML VIAL: Performed by: INTERNAL MEDICINE

## 2020-07-02 PROCEDURE — 63710000001 DIPHENHYDRAMINE PER 50 MG: Performed by: INTERNAL MEDICINE

## 2020-07-02 RX ORDER — SODIUM CHLORIDE 9 MG/ML
250 INJECTION, SOLUTION INTRAVENOUS ONCE
Status: CANCELLED | OUTPATIENT
Start: 2020-07-09

## 2020-07-02 RX ORDER — ACETAMINOPHEN 325 MG/1
650 TABLET ORAL ONCE
Status: CANCELLED | OUTPATIENT
Start: 2020-07-09

## 2020-07-02 RX ORDER — DIPHENHYDRAMINE HYDROCHLORIDE 50 MG/ML
50 INJECTION INTRAMUSCULAR; INTRAVENOUS AS NEEDED
Status: CANCELLED | OUTPATIENT
Start: 2020-07-09

## 2020-07-02 RX ORDER — DIPHENHYDRAMINE HCL 25 MG
25 CAPSULE ORAL ONCE
Status: CANCELLED | OUTPATIENT
Start: 2020-07-09

## 2020-07-02 RX ORDER — METHYLPREDNISOLONE SODIUM SUCCINATE 125 MG/2ML
125 INJECTION, POWDER, LYOPHILIZED, FOR SOLUTION INTRAMUSCULAR; INTRAVENOUS AS NEEDED
Status: CANCELLED | OUTPATIENT
Start: 2020-07-09

## 2020-07-02 RX ORDER — DIPHENHYDRAMINE HCL 25 MG
25 CAPSULE ORAL ONCE
Status: COMPLETED | OUTPATIENT
Start: 2020-07-02 | End: 2020-07-02

## 2020-07-02 RX ORDER — ACETAMINOPHEN 325 MG/1
650 TABLET ORAL ONCE
Status: COMPLETED | OUTPATIENT
Start: 2020-07-02 | End: 2020-07-02

## 2020-07-02 RX ORDER — SODIUM CHLORIDE 9 MG/ML
250 INJECTION, SOLUTION INTRAVENOUS ONCE
Status: COMPLETED | OUTPATIENT
Start: 2020-07-02 | End: 2020-07-02

## 2020-07-02 RX ADMIN — FERRIC CARBOXYMALTOSE INJECTION 750 MG: 50 INJECTION, SOLUTION INTRAVENOUS at 14:20

## 2020-07-02 RX ADMIN — ACETAMINOPHEN 650 MG: 325 TABLET, FILM COATED ORAL at 14:02

## 2020-07-02 RX ADMIN — SODIUM CHLORIDE 250 ML: 9 INJECTION, SOLUTION INTRAVENOUS at 14:03

## 2020-07-02 RX ADMIN — DIPHENHYDRAMINE HYDROCHLORIDE 25 MG: 25 CAPSULE ORAL at 14:09

## 2020-07-09 ENCOUNTER — INFUSION (OUTPATIENT)
Dept: ONCOLOGY | Facility: HOSPITAL | Age: 45
End: 2020-07-09

## 2020-07-09 VITALS — HEART RATE: 78 BPM | DIASTOLIC BLOOD PRESSURE: 61 MMHG | SYSTOLIC BLOOD PRESSURE: 122 MMHG | TEMPERATURE: 97.8 F

## 2020-07-09 DIAGNOSIS — Z98.84 HISTORY OF GASTRIC BYPASS: ICD-10-CM

## 2020-07-09 DIAGNOSIS — D50.9 IRON DEFICIENCY ANEMIA, UNSPECIFIED IRON DEFICIENCY ANEMIA TYPE: Primary | ICD-10-CM

## 2020-07-09 PROCEDURE — 96374 THER/PROPH/DIAG INJ IV PUSH: CPT | Performed by: INTERNAL MEDICINE

## 2020-07-09 PROCEDURE — 63710000001 DIPHENHYDRAMINE PER 50 MG: Performed by: INTERNAL MEDICINE

## 2020-07-09 PROCEDURE — 25010000002 FERRIC CARBOXYMALTOSE 750 MG/15ML SOLUTION 15 ML VIAL: Performed by: INTERNAL MEDICINE

## 2020-07-09 RX ORDER — DIPHENHYDRAMINE HCL 25 MG
25 CAPSULE ORAL ONCE
Status: COMPLETED | OUTPATIENT
Start: 2020-07-09 | End: 2020-07-09

## 2020-07-09 RX ORDER — DIPHENHYDRAMINE HCL 25 MG
25 CAPSULE ORAL ONCE
Status: CANCELLED | OUTPATIENT
Start: 2020-07-16

## 2020-07-09 RX ORDER — METHYLPREDNISOLONE SODIUM SUCCINATE 125 MG/2ML
125 INJECTION, POWDER, LYOPHILIZED, FOR SOLUTION INTRAMUSCULAR; INTRAVENOUS AS NEEDED
Status: DISCONTINUED | OUTPATIENT
Start: 2020-07-09 | End: 2020-07-09 | Stop reason: HOSPADM

## 2020-07-09 RX ORDER — ACETAMINOPHEN 325 MG/1
650 TABLET ORAL ONCE
Status: COMPLETED | OUTPATIENT
Start: 2020-07-09 | End: 2020-07-09

## 2020-07-09 RX ORDER — DIPHENHYDRAMINE HYDROCHLORIDE 50 MG/ML
50 INJECTION INTRAMUSCULAR; INTRAVENOUS AS NEEDED
Status: DISCONTINUED | OUTPATIENT
Start: 2020-07-09 | End: 2020-07-09 | Stop reason: HOSPADM

## 2020-07-09 RX ORDER — DIPHENHYDRAMINE HYDROCHLORIDE 50 MG/ML
50 INJECTION INTRAMUSCULAR; INTRAVENOUS AS NEEDED
OUTPATIENT
Start: 2020-07-16

## 2020-07-09 RX ORDER — ACETAMINOPHEN 325 MG/1
650 TABLET ORAL ONCE
Status: CANCELLED | OUTPATIENT
Start: 2020-07-16

## 2020-07-09 RX ORDER — SODIUM CHLORIDE 9 MG/ML
250 INJECTION, SOLUTION INTRAVENOUS ONCE
Status: CANCELLED | OUTPATIENT
Start: 2020-07-16

## 2020-07-09 RX ORDER — SODIUM CHLORIDE 9 MG/ML
250 INJECTION, SOLUTION INTRAVENOUS ONCE
Status: COMPLETED | OUTPATIENT
Start: 2020-07-09 | End: 2020-07-09

## 2020-07-09 RX ORDER — METHYLPREDNISOLONE SODIUM SUCCINATE 125 MG/2ML
125 INJECTION, POWDER, LYOPHILIZED, FOR SOLUTION INTRAMUSCULAR; INTRAVENOUS AS NEEDED
OUTPATIENT
Start: 2020-07-16

## 2020-07-09 RX ADMIN — FERRIC CARBOXYMALTOSE INJECTION 750 MG: 50 INJECTION, SOLUTION INTRAVENOUS at 15:13

## 2020-07-09 RX ADMIN — DIPHENHYDRAMINE HYDROCHLORIDE 25 MG: 25 CAPSULE ORAL at 14:50

## 2020-07-09 RX ADMIN — SODIUM CHLORIDE 250 ML: 9 INJECTION, SOLUTION INTRAVENOUS at 14:49

## 2020-07-09 RX ADMIN — ACETAMINOPHEN 650 MG: 325 TABLET, FILM COATED ORAL at 14:50

## 2020-07-22 ENCOUNTER — OFFICE VISIT (OUTPATIENT)
Dept: FAMILY MEDICINE CLINIC | Facility: CLINIC | Age: 45
End: 2020-07-22

## 2020-07-22 VITALS
HEART RATE: 80 BPM | DIASTOLIC BLOOD PRESSURE: 78 MMHG | TEMPERATURE: 99.2 F | HEIGHT: 67 IN | SYSTOLIC BLOOD PRESSURE: 128 MMHG | WEIGHT: 222.6 LBS | OXYGEN SATURATION: 99 % | BODY MASS INDEX: 34.94 KG/M2 | RESPIRATION RATE: 18 BRPM

## 2020-07-22 DIAGNOSIS — K21.9 GASTROESOPHAGEAL REFLUX DISEASE, ESOPHAGITIS PRESENCE NOT SPECIFIED: ICD-10-CM

## 2020-07-22 DIAGNOSIS — R10.9 RIGHT FLANK PAIN: ICD-10-CM

## 2020-07-22 DIAGNOSIS — R25.2 BILATERAL LEG CRAMPS: ICD-10-CM

## 2020-07-22 DIAGNOSIS — R10.30 LOWER ABDOMINAL PAIN: ICD-10-CM

## 2020-07-22 DIAGNOSIS — R11.0 NAUSEA: Primary | ICD-10-CM

## 2020-07-22 LAB
BILIRUB BLD-MCNC: ABNORMAL MG/DL
CLARITY, POC: ABNORMAL
COLOR UR: ABNORMAL
GLUCOSE UR STRIP-MCNC: NEGATIVE MG/DL
KETONES UR QL: ABNORMAL
LEUKOCYTE EST, POC: NEGATIVE
NITRITE UR-MCNC: NEGATIVE MG/ML
PH UR: 6 [PH] (ref 5–8)
PROT UR STRIP-MCNC: ABNORMAL MG/DL
RBC # UR STRIP: NEGATIVE /UL
SP GR UR: 1.03 (ref 1–1.03)
UROBILINOGEN UR QL: NORMAL

## 2020-07-22 PROCEDURE — 87086 URINE CULTURE/COLONY COUNT: CPT | Performed by: NURSE PRACTITIONER

## 2020-07-22 PROCEDURE — 99213 OFFICE O/P EST LOW 20 MIN: CPT | Performed by: NURSE PRACTITIONER

## 2020-07-22 PROCEDURE — 81003 URINALYSIS AUTO W/O SCOPE: CPT | Performed by: NURSE PRACTITIONER

## 2020-07-22 RX ORDER — OMEPRAZOLE 40 MG/1
40 CAPSULE, DELAYED RELEASE ORAL DAILY
Qty: 30 CAPSULE | Refills: 0 | Status: SHIPPED | OUTPATIENT
Start: 2020-07-22 | End: 2020-11-05

## 2020-07-22 NOTE — PROGRESS NOTES
"Chief Complaint   Patient presents with   • Follow-up     1 month f/u nausea and leg cramp       Subjective:  Candy Hernandez is a 45 y.o. female who presents for f/u for nausea and leg pain. Reports nausea has improved and bilat leg pain is now intermittent and not every night. Also c/o sporadic right hip/flank pain. Does report hx of kidney stones. Denies urinary sx, fever, constipation or diarrhea or inability to urinate. Hx of anemia for which she is seeing hem/onc and receiving iron infusions.      6/22/2020  Candy Hernandez is a 45 y.o. female who [facility]. Reports recurrent intermittent nausea x 2 weeks. Initially had diarrhea but reports that has resolved. Was seen in walk-in clinic last week and prescribed zofran which she reports is helping. Just started Bactrim for UTI yesterday and iron supplement for anemia. Has appt with hem/onc this Thursday 25th. Also reports bilat leg cramps and mild tingling mainly at night. Reports she does not drink enough water and has increased consumption of \"Alon and Coke\" at least daily over the past year. Reports weight loss surgery that was performed in [place] in 2017 but moved here and never followed up with surgeon afterwards. Reports UGI and colonoscopy performed before weight loss surgery in 2017 d/t rectal bleeding. No records to review today.     The following portions of the patient's history were reviewed and updated as appropriate: allergies, current medications, past family history, past medical history, past social history, past surgical history and problem list.    Nausea   This is a recurrent problem. The current episode started more than 1 month ago. The problem occurs intermittently. The problem has been gradually improving. Associated symptoms include myalgias (bilat LE cramping at HS - improving) and nausea. Pertinent negatives include no abdominal pain, change in bowel habit, chest pain, coughing, fatigue, fever, headaches, numbness, " urinary symptoms or vomiting. Treatments tried: zofran. The treatment provided significant relief.   Leg Pain    There was no injury mechanism. The pain is present in the left leg and right leg. The quality of the pain is described as cramping (mostly at night). The patient is experiencing no pain. The pain has been improving since onset. Pertinent negatives include no numbness or tingling. Nothing aggravates the symptoms. She has tried nothing for the symptoms. The treatment provided moderate relief.   Hip Pain    There was no injury mechanism. The pain is present in the right hip (and right flank). The quality of the pain is described as aching. The pain is at a severity of 3/10. The pain is mild. The pain has been intermittent since onset. Pertinent negatives include no numbness or tingling. Nothing aggravates the symptoms. She has tried nothing for the symptoms. The treatment provided no relief.        Past Medical History:   Diagnosis Date   • Anemia    • Asthma    • GERD (gastroesophageal reflux disease)    • Sleep apnea          Current Outpatient Medications:   •  albuterol sulfate  (90 Base) MCG/ACT inhaler, Inhale 2 puffs Every 4 (Four) Hours As Needed for Wheezing or Shortness of Air., Disp: 1 inhaler, Rfl: 2  •  Cholecalciferol (VITAMIN D3) 1.25 MG (74144 UT) capsule, , Disp: , Rfl:   •  Cyanocobalamin (B-12) 1000 MCG sublingual tablet, Place 1,000 mcg under the tongue Daily., Disp: 90 each, Rfl: 3  •  dicyclomine (Bentyl) 20 MG tablet, Take 1 tablet by mouth 3 (Three) Times a Day As Needed (abdominal cramping)., Disp: 30 tablet, Rfl: 0  •  Fluticasone Propionate, Inhal, (Flovent Diskus) 250 MCG/BLIST aerosol powder , Inhale 1 puff Every 12 (Twelve) Hours., Disp: , Rfl:   •  folic acid (FOLVITE) 1 MG tablet, Take 1 tablet by mouth Daily., Disp: 90 tablet, Rfl: 3  •  Multiple Vitamin (MULTI VITAMIN DAILY PO), Take  by mouth., Disp: , Rfl:   •  omeprazole (priLOSEC) 40 MG capsule, TAKE 1 CAPSULE BY  "MOUTH DAILY, Disp: 30 capsule, Rfl: 0  •  ondansetron ODT (Zofran ODT) 8 MG disintegrating tablet, Place 1 tablet on the tongue Every 8 (Eight) Hours As Needed for Nausea or Vomiting., Disp: 30 tablet, Rfl: 0  •  sodium chloride 0.9 % nebulizer solution 0.74 mL with albuterol (5 MG/ML) 0.5% nebulizer solution 1.3 mg, Inhale 3 mL Every 8 (Eight) Hours., Disp: , Rfl:     Review of Systems    Review of Systems   Constitutional: Negative for activity change, appetite change, fatigue, fever and unexpected weight change.   Respiratory: Negative for cough, chest tightness and shortness of breath.    Cardiovascular: Negative for chest pain, palpitations and leg swelling.   Gastrointestinal: Positive for nausea. Negative for abdominal pain, change in bowel habit, constipation, diarrhea and vomiting.   Genitourinary: Positive for flank pain and menstrual problem. Negative for decreased urine volume, difficulty urinating, dysuria, frequency, hematuria, pelvic pain, urgency and vaginal pain.   Musculoskeletal: Positive for myalgias (bilat LE cramping at HS - improving).   Skin: Negative for color change and pallor.   Neurological: Negative for dizziness, tingling, light-headedness, numbness and headaches.       Objective  Vitals:    07/22/20 1059   BP: 128/78   BP Location: Right arm   Patient Position: Sitting   Cuff Size: Adult   Pulse: 80   Resp: 18   Temp: 99.2 °F (37.3 °C)   SpO2: 99%   Weight: 101 kg (222 lb 9.6 oz)   Height: 170.2 cm (67\")       Physical Exam   Constitutional: She is oriented to person, place, and time. She appears well-developed and well-nourished. No distress.   HENT:   Head: Normocephalic and atraumatic.   Wearing face mask d/t pandemic   Eyes: Pupils are equal, round, and reactive to light. Conjunctivae are normal.   Neck: Normal range of motion. Neck supple.   Cardiovascular: Normal rate, regular rhythm and normal heart sounds.   Pulmonary/Chest: Effort normal and breath sounds normal.   Abdominal: " Soft. Bowel sounds are normal. There is no tenderness. There is no rebound and no guarding.   Musculoskeletal: Normal range of motion. She exhibits no tenderness.   Neurological: She is alert and oriented to person, place, and time.   Skin: Skin is warm and dry.   Psychiatric: She has a normal mood and affect. Her behavior is normal.   Nursing note and vitals reviewed.        Candy was seen today for follow-up.    Diagnoses and all orders for this visit:    Nausea    Bilateral leg cramps    Right flank pain  -     POC Urinalysis Dipstick, Automated  -     Urine Culture - Urine, Urine, Clean Catch; Future  -     Urine Culture - Urine, Urine, Clean Catch    Lower abdominal pain  -     POC Urinalysis Dipstick, Automated  -     Urine Culture - Urine, Urine, Clean Catch; Future  -     Urine Culture - Urine, Urine, Clean Catch      Brief Urine Lab Results  (Last result in the past 365 days)      Color   Clarity   Blood   Leuk Est   Nitrite   Protein   CREAT   Urine HCG        07/22/20 1143 Dark Yellow Cloudy Negative Negative Negative Trace             1. Nausea - improving - cont omeprazole and zofran PRN. Will cont to monitor.  2. Bilat leg cramps - improving - cont increased oral water intake and electrolyte replacement once daily.  3. Lower abd/right flank pain - POCT urine obtained - will send for culture d/t hx of UTI. Will wait to tx for UTI until culture results are reviewed.  4. RTW on 7/25/2020 provided.  5. RTC as scheduled or PRN.      This document has been electronically signed by DARIEN Sharma on July 28, 2020 23:37

## 2020-07-24 LAB — BACTERIA SPEC AEROBE CULT: NO GROWTH

## 2020-07-27 ENCOUNTER — TELEPHONE (OUTPATIENT)
Dept: FAMILY MEDICINE CLINIC | Facility: CLINIC | Age: 45
End: 2020-07-27

## 2020-07-27 NOTE — TELEPHONE ENCOUNTER
Patient calling because per her appt on 7/22/20, she was supposed to be called some medication in for a uti and she is calling to request that this medication be sent to the following pharmacy:    Pharmacy: [id number] [place], [place] - [address] - [phone]  - [phone] FX  [phone]    Please call to advise and confirm [phone]

## 2020-08-21 ENCOUNTER — LAB (OUTPATIENT)
Dept: LAB | Facility: HOSPITAL | Age: 45
End: 2020-08-21

## 2020-08-21 ENCOUNTER — OFFICE VISIT (OUTPATIENT)
Dept: FAMILY MEDICINE CLINIC | Facility: CLINIC | Age: 45
End: 2020-08-21

## 2020-08-21 VITALS
HEART RATE: 89 BPM | RESPIRATION RATE: 20 BRPM | WEIGHT: 224 LBS | DIASTOLIC BLOOD PRESSURE: 82 MMHG | SYSTOLIC BLOOD PRESSURE: 126 MMHG | TEMPERATURE: 98.4 F | OXYGEN SATURATION: 96 % | HEIGHT: 67 IN | BODY MASS INDEX: 35.16 KG/M2

## 2020-08-21 DIAGNOSIS — R35.0 URINARY FREQUENCY: ICD-10-CM

## 2020-08-21 DIAGNOSIS — R10.2 PELVIC CRAMPING: Primary | ICD-10-CM

## 2020-08-21 DIAGNOSIS — R10.9 FLANK PAIN: ICD-10-CM

## 2020-08-21 DIAGNOSIS — Z98.84 HISTORY OF GASTRIC BYPASS: ICD-10-CM

## 2020-08-21 DIAGNOSIS — N92.6 IRREGULAR PERIODS/MENSTRUAL CYCLES: ICD-10-CM

## 2020-08-21 DIAGNOSIS — D50.9 IRON DEFICIENCY ANEMIA, UNSPECIFIED IRON DEFICIENCY ANEMIA TYPE: ICD-10-CM

## 2020-08-21 DIAGNOSIS — M62.830 BACK SPASM: ICD-10-CM

## 2020-08-21 LAB
B-HCG UR QL: NEGATIVE
BASOPHILS # BLD AUTO: 0.01 10*3/MM3 (ref 0–0.2)
BASOPHILS NFR BLD AUTO: 0.1 % (ref 0–1.5)
BILIRUB BLD-MCNC: ABNORMAL MG/DL
CANDIDA ALBICANS: NEGATIVE
CLARITY, POC: CLEAR
COLOR UR: ABNORMAL
DEPRECATED RDW RBC AUTO: ABNORMAL FL
EOSINOPHIL # BLD AUTO: 0.11 10*3/MM3 (ref 0–0.4)
EOSINOPHIL NFR BLD AUTO: 1.3 % (ref 0.3–6.2)
ERYTHROCYTE [DISTWIDTH] IN BLOOD BY AUTOMATED COUNT: ABNORMAL %
FERRITIN SERPL-MCNC: 180.6 NG/ML (ref 13–150)
FOLATE SERPL-MCNC: 5.93 NG/ML (ref 4.78–24.2)
GARDNERELLA VAGINALIS: POSITIVE
GLUCOSE UR STRIP-MCNC: NEGATIVE MG/DL
HCT VFR BLD AUTO: 45.5 % (ref 34–46.6)
HGB BLD-MCNC: 14 G/DL (ref 12–15.9)
IMM GRANULOCYTES # BLD AUTO: 0.02 10*3/MM3 (ref 0–0.05)
IMM GRANULOCYTES NFR BLD AUTO: 0.2 % (ref 0–0.5)
INTERNAL NEGATIVE CONTROL: NEGATIVE
INTERNAL POSITIVE CONTROL: POSITIVE
IRON 24H UR-MRATE: 112 MCG/DL (ref 37–145)
IRON SATN MFR SERPL: 28 % (ref 20–50)
KETONES UR QL: ABNORMAL
LEUKOCYTE EST, POC: NEGATIVE
LYMPHOCYTES # BLD AUTO: 2.57 10*3/MM3 (ref 0.7–3.1)
LYMPHOCYTES NFR BLD AUTO: 31.2 % (ref 19.6–45.3)
Lab: NORMAL
MCH RBC QN AUTO: 25.6 PG (ref 26.6–33)
MCHC RBC AUTO-ENTMCNC: 30.8 G/DL (ref 31.5–35.7)
MCV RBC AUTO: 83.3 FL (ref 79–97)
MONOCYTES # BLD AUTO: 0.52 10*3/MM3 (ref 0.1–0.9)
MONOCYTES NFR BLD AUTO: 6.3 % (ref 5–12)
NEUTROPHILS NFR BLD AUTO: 5.02 10*3/MM3 (ref 1.7–7)
NEUTROPHILS NFR BLD AUTO: 60.9 % (ref 42.7–76)
NITRITE UR-MCNC: NEGATIVE MG/ML
NRBC BLD AUTO-RTO: 0 /100 WBC (ref 0–0.2)
PH UR: 6 [PH] (ref 5–8)
PLATELET # BLD AUTO: 227 10*3/MM3 (ref 140–450)
PMV BLD AUTO: 10.7 FL (ref 6–12)
PROT UR STRIP-MCNC: NEGATIVE MG/DL
RBC # BLD AUTO: 5.46 10*6/MM3 (ref 3.77–5.28)
RBC # UR STRIP: NEGATIVE /UL
RBC MORPH BLD: NORMAL
SMALL PLATELETS BLD QL SMEAR: ADEQUATE
SP GR UR: 1.02 (ref 1–1.03)
T VAGINALIS DNA VAG QL PROBE+SIG AMP: NEGATIVE
TIBC SERPL-MCNC: 402 MCG/DL (ref 298–536)
TRANSFERRIN SERPL-MCNC: 270 MG/DL (ref 200–360)
UROBILINOGEN UR QL: NORMAL
VIT B12 BLD-MCNC: 492 PG/ML (ref 211–946)
WBC # BLD AUTO: 8.25 10*3/MM3 (ref 3.4–10.8)
WBC MORPH BLD: NORMAL

## 2020-08-21 PROCEDURE — 81015 MICROSCOPIC EXAM OF URINE: CPT | Performed by: NURSE PRACTITIONER

## 2020-08-21 PROCEDURE — 99214 OFFICE O/P EST MOD 30 MIN: CPT | Performed by: NURSE PRACTITIONER

## 2020-08-21 PROCEDURE — 81003 URINALYSIS AUTO W/O SCOPE: CPT | Performed by: NURSE PRACTITIONER

## 2020-08-21 PROCEDURE — 96372 THER/PROPH/DIAG INJ SC/IM: CPT | Performed by: NURSE PRACTITIONER

## 2020-08-21 PROCEDURE — 85007 BL SMEAR W/DIFF WBC COUNT: CPT

## 2020-08-21 PROCEDURE — 87660 TRICHOMONAS VAGIN DIR PROBE: CPT | Performed by: NURSE PRACTITIONER

## 2020-08-21 PROCEDURE — 82746 ASSAY OF FOLIC ACID SERUM: CPT

## 2020-08-21 PROCEDURE — 85025 COMPLETE CBC W/AUTO DIFF WBC: CPT

## 2020-08-21 PROCEDURE — 87480 CANDIDA DNA DIR PROBE: CPT | Performed by: NURSE PRACTITIONER

## 2020-08-21 PROCEDURE — 82607 VITAMIN B-12: CPT

## 2020-08-21 PROCEDURE — 83540 ASSAY OF IRON: CPT

## 2020-08-21 PROCEDURE — 87086 URINE CULTURE/COLONY COUNT: CPT | Performed by: NURSE PRACTITIONER

## 2020-08-21 PROCEDURE — 87510 GARDNER VAG DNA DIR PROBE: CPT | Performed by: NURSE PRACTITIONER

## 2020-08-21 PROCEDURE — 81025 URINE PREGNANCY TEST: CPT | Performed by: NURSE PRACTITIONER

## 2020-08-21 PROCEDURE — 84466 ASSAY OF TRANSFERRIN: CPT

## 2020-08-21 PROCEDURE — 82728 ASSAY OF FERRITIN: CPT

## 2020-08-21 RX ORDER — KETOROLAC TROMETHAMINE 30 MG/ML
60 INJECTION, SOLUTION INTRAMUSCULAR; INTRAVENOUS ONCE
Status: COMPLETED | OUTPATIENT
Start: 2020-08-21 | End: 2020-08-21

## 2020-08-21 RX ORDER — TIZANIDINE 2 MG/1
2 TABLET ORAL EVERY 8 HOURS PRN
Qty: 30 TABLET | Refills: 0 | Status: SHIPPED | OUTPATIENT
Start: 2020-08-21 | End: 2020-08-28

## 2020-08-21 RX ADMIN — KETOROLAC TROMETHAMINE 60 MG: 30 INJECTION, SOLUTION INTRAMUSCULAR; INTRAVENOUS at 09:39

## 2020-08-21 NOTE — PROGRESS NOTES
"Subjective   Candy Hernandez is a 45 y.o. female.     [facility] Same Day Appointment    PCP: DARIEN Juan    CC: \"flank pain, abdominal pressure; urinary frequency; nausea\"    Had UTI in June that was treated, had repeat u/a and culture in July that was negative.  Reports hx of irregular cycles for the last 8-9 months with significant cramping associated with these.  Never had pelvic u/s.  Is receiving iron infusions through [facility].  Last pap was 2 years ago and reportedly normal.  No concern for STD.      PSH: tubal ligation; gastric bypass surgery     Report heavy alcohol use over the last few months, but has decreased significantly since earlier in the year.  LFTS in June were normal.      Reports upper back spasms that come and go.  Right lower quadrant/pelvic pain is intermittent and severe when it occurs over the last 2 weeks.  Flank pain also comes/goes.      LMP: 8-    Pelvic Pain   The patient's primary symptoms include missed menses (irregular x 8-9 months) and pelvic pain. The patient's pertinent negatives include no genital itching, genital lesions, genital odor, genital rash, vaginal bleeding or vaginal discharge. This is a new problem. Episode onset: x 2 weeks ago. The problem occurs daily. The problem has been waxing and waning. The pain is moderate (when it hits). The problem affects the right side. She is not pregnant. Associated symptoms include abdominal pain (rlq/pelvic), back pain (flank area; upper back spasms), flank pain, frequency and nausea. Pertinent negatives include no anorexia, chills, constipation, diarrhea ( no watery stools, loose at times), discolored urine, dysuria, fever, headaches, hematuria, joint pain, joint swelling, painful intercourse, rash, sore throat, urgency or vomiting. Nothing aggravates the symptoms. She has tried nothing for the symptoms. She is sexually active. No (no concern for STD), her partner does not have an STD. She uses tubal ligation for " "contraception. Her menstrual history has been irregular. There is no history of ovarian cysts. (+gastric bypass; tubal ligation; lymph node removal anterior neck in 2010 (thyroid was normal))        The following portions of the patient's history were reviewed and updated as appropriate: allergies, current medications, past medical history, past social history, past surgical history and problem list.    Review of Systems   Constitutional: Positive for appetite change ( decreased, but still eating/drking). Negative for chills, fever, unexpected weight gain and unexpected weight loss.   HENT: Negative.  Negative for sore throat.    Eyes: Negative.    Respiratory: Negative.    Cardiovascular: Negative.    Gastrointestinal: Positive for abdominal pain (rlq/pelvic) and nausea. Negative for anorexia, blood in stool, constipation, diarrhea ( no watery stools, loose at times), vomiting, GERD and indigestion.   Genitourinary: Positive for flank pain, frequency, missed menses (irregular x 8-9 months), pelvic pain and pelvic pressure. Negative for difficulty urinating, dysuria, hematuria, urgency and vaginal discharge.   Musculoskeletal: Positive for back pain (flank area; upper back spasms). Negative for joint pain.   Skin: Negative for rash.   Neurological: Negative for dizziness, light-headedness and headache.     /82 (BP Location: Right arm, Patient Position: Sitting, Cuff Size: Large Adult)   Pulse 89   Temp 98.4 °F (36.9 °C) (Temporal)   Resp 20   Ht 170.2 cm (67\")   Wt 102 kg (224 lb)   LMP 08/13/2020   SpO2 96%   Breastfeeding No   BMI 35.08 kg/m²     Objective   Physical Exam   Constitutional: She is oriented to person, place, and time. She appears well-developed and well-nourished. No distress.   HENT:   Head: Normocephalic and atraumatic.   Right Ear: Tympanic membrane and ear canal normal.   Left Ear: Tympanic membrane and ear canal normal.   Nose: Nose normal. Right sinus exhibits no maxillary sinus " tenderness and no frontal sinus tenderness. Left sinus exhibits no maxillary sinus tenderness and no frontal sinus tenderness.   Mouth/Throat: Uvula is midline, oropharynx is clear and moist and mucous membranes are normal.   Eyes: Conjunctivae are normal. Right eye exhibits no discharge. Left eye exhibits no discharge. No scleral icterus.   Neck: Neck supple.   Cardiovascular: Normal rate and regular rhythm.   Pulmonary/Chest: Effort normal and breath sounds normal. She has no wheezes. She has no rales.   Abdominal: Soft. Bowel sounds are normal. She exhibits no distension. There is tenderness ( mild RLQ/pelvic TTP). There is no rebound and no guarding.   Genitourinary:   Genitourinary Comments: Mild flank pain on right     Musculoskeletal:        Thoracic back: She exhibits tenderness and spasm (upper back). She exhibits normal range of motion.        Back:    Lymphadenopathy:     She has no cervical adenopathy.   Neurological: She is alert and oriented to person, place, and time.   Skin: Skin is warm and dry.   Nursing note and vitals reviewed.    Recent Results (from the past 24 hour(s))   POCT urinalysis dipstick, automated    Collection Time: 08/21/20  9:18 AM   Result Value Ref Range    Color Orange (A) Yellow, Straw, Dark Yellow, Dayna    Clarity, UA Clear Clear    Specific Gravity  1.025 1.005 - 1.030    pH, Urine 6.0 5.0 - 8.0    Leukocytes Negative Negative    Nitrite, UA Negative Negative    Protein, POC Negative Negative mg/dL    Glucose, UA Negative Negative, 1000 mg/dL (3+) mg/dL    Ketones, UA 2+ (A) Negative    Urobilinogen, UA Normal Normal    Bilirubin 1 mg/dL (A) Negative    Blood, UA Negative Negative   POCT pregnancy, urine    Collection Time: 08/21/20  9:55 AM   Result Value Ref Range    HCG, Urine, QL Negative Negative    Lot Number [id number]     Internal Positive Control Positive     Internal Negative Control Negative      No Images in the past 120 days found..      Assessment/Plan      Diagnoses and all orders for this visit:    Pelvic cramping  -     Gardnerella vaginalis, Trichomonas vaginalis, Candida albicans, DNA - Swab, Vagina  -     US Non-ob Transvaginal; Future  -     ketorolac (TORADOL) injection 60 mg  -     POCT pregnancy, urine  -     Ambulatory Referral to Gynecology    Urinary frequency  -     POCT urinalysis dipstick, automated  -     Urine Culture - Urine, Urine, Clean Catch  -     Urinalysis, Microscopic Only - Urine, Clean Catch    Irregular periods/menstrual cycles  -     US Non-ob Transvaginal; Future  -     Ambulatory Referral to Gynecology    Flank pain  -     XR Abdomen KUB (In Office)    Back spasm  -     tiZANidine (ZANAFLEX) 2 MG tablet; Take 1 tablet by mouth Every 8 (Eight) Hours As Needed for Muscle Spasms.      Further work up as above--will follow with results when available  Toradol 60 mg IM x 1 in office today  Rx for Zanaflex PRN spasms--do not drive/work while taking  Moist heat as needed  Tylenol as needed    RTW: 8-    Patient's Body mass index is 35.08 kg/m². BMI is above normal parameters. Recommendations include: referral to primary care--has already had gastric bypass.    See PCP or RTC if symptoms persist/worsen  See PCP for routine f/u visit and management of chronic medical conditions      This document has been electronically signed by DARIEN oGnsales on August 21, 2020 10:08,.

## 2020-08-22 LAB
BACTERIA SPEC AEROBE CULT: NO GROWTH
BACTERIA UR QL AUTO: NORMAL /HPF
HYALINE CASTS UR QL AUTO: NORMAL /LPF
RBC # UR: NORMAL /HPF
REF LAB TEST METHOD: NORMAL
SQUAMOUS #/AREA URNS HPF: NORMAL /HPF
WBC UR QL AUTO: NORMAL /HPF

## 2020-08-24 DIAGNOSIS — N76.0 BV (BACTERIAL VAGINOSIS): Primary | ICD-10-CM

## 2020-08-24 DIAGNOSIS — B96.89 BV (BACTERIAL VAGINOSIS): Primary | ICD-10-CM

## 2020-08-24 RX ORDER — METRONIDAZOLE 7.5 MG/G
GEL VAGINAL NIGHTLY
Qty: 70 G | Refills: 0 | Status: SHIPPED | OUTPATIENT
Start: 2020-08-24 | End: 2020-08-29

## 2020-08-24 NOTE — PROGRESS NOTES
Pt notified of lab results and of rx sent to pharmacy, pt notified of GYN appt for 9/16/2020 @ 1pm.

## 2020-08-26 DIAGNOSIS — R11.0 NAUSEA: ICD-10-CM

## 2020-08-26 RX ORDER — ONDANSETRON 8 MG/1
8 TABLET, ORALLY DISINTEGRATING ORAL EVERY 8 HOURS PRN
Qty: 30 TABLET | Refills: 0 | Status: SHIPPED | OUTPATIENT
Start: 2020-08-26 | End: 2020-11-05

## 2020-08-26 RX ORDER — ONDANSETRON 8 MG/1
8 TABLET, ORALLY DISINTEGRATING ORAL EVERY 8 HOURS PRN
Qty: 30 TABLET | Refills: 0 | Status: CANCELLED | OUTPATIENT
Start: 2020-08-26

## 2020-08-26 NOTE — TELEPHONE ENCOUNTER
Pt notified of US results from  group.   Pt also states she is still having some pain and associated nausea- She asked if you could send in more Zofran for her to the [facility].

## 2020-08-28 DIAGNOSIS — M62.830 BACK SPASM: ICD-10-CM

## 2020-08-28 RX ORDER — TIZANIDINE 2 MG/1
TABLET ORAL
Qty: 30 TABLET | Refills: 0 | Status: SHIPPED | OUTPATIENT
Start: 2020-08-28 | End: 2020-11-05

## 2020-09-02 DIAGNOSIS — R10.2 PELVIC CRAMPING: ICD-10-CM

## 2020-09-02 DIAGNOSIS — N92.6 IRREGULAR PERIODS/MENSTRUAL CYCLES: ICD-10-CM

## 2020-09-04 DIAGNOSIS — M62.830 BACK SPASM: ICD-10-CM

## 2020-09-04 RX ORDER — TIZANIDINE 2 MG/1
TABLET ORAL
Qty: 30 TABLET | Refills: 0 | OUTPATIENT
Start: 2020-09-04

## 2020-09-16 ENCOUNTER — OFFICE VISIT (OUTPATIENT)
Dept: OBSTETRICS AND GYNECOLOGY | Facility: CLINIC | Age: 45
End: 2020-09-16

## 2020-09-16 VITALS
HEIGHT: 67 IN | WEIGHT: 228 LBS | BODY MASS INDEX: 35.79 KG/M2 | SYSTOLIC BLOOD PRESSURE: 120 MMHG | DIASTOLIC BLOOD PRESSURE: 78 MMHG

## 2020-09-16 DIAGNOSIS — N92.1 MENORRHAGIA WITH IRREGULAR CYCLE: ICD-10-CM

## 2020-09-16 DIAGNOSIS — R10.2 PELVIC PAIN: ICD-10-CM

## 2020-09-16 DIAGNOSIS — N94.6 DYSMENORRHEA: Primary | ICD-10-CM

## 2020-09-16 PROCEDURE — 87591 N.GONORRHOEAE DNA AMP PROB: CPT | Performed by: NURSE PRACTITIONER

## 2020-09-16 PROCEDURE — 87661 TRICHOMONAS VAGINALIS AMPLIF: CPT | Performed by: NURSE PRACTITIONER

## 2020-09-16 PROCEDURE — 99214 OFFICE O/P EST MOD 30 MIN: CPT | Performed by: NURSE PRACTITIONER

## 2020-09-16 PROCEDURE — 87491 CHLMYD TRACH DNA AMP PROBE: CPT | Performed by: NURSE PRACTITIONER

## 2020-09-16 NOTE — PROGRESS NOTES
Subjective   Chief Complaint   Patient presents with   • Gynecologic Exam     newpt     Candy Hernandez is a 45 y.o. year old No obstetric history on file. presenting to be seen with complaints of trouble with her menses.   Current birth control method: tubal ligation.    Patient's last menstrual period was 09/16/2020 (exact date).    The last time she recalls having predictable regular cycles was about 9 months ago. She has been skipping a month here and there for the past year and also having hot flashes and night sweats. Her periods have gotten increasingly more painful and heavier since they became irregular. She has had to leave work on several occasions due to pain and once in July when she bled through her pad and clothing.    Ms. Hernandez is unable to take NSAIDs 2/2 h/o gastric bypass surgery and has been taking Midol and/or Tylenol without relief from her pain. She was given tizanidine 2mg to help with the pain in August; however, she states that it does not work well for pain relief and makes her very sleepy so she isn't able to take it during the daytime when she feels that she needs it the most to help her get through her work day.      · Additional notable symptoms: hot flashes, night sweats and difficulty sleeping (moderate)  · Changes in weight: weight has been stable  · Recent increase in stress? no  · Is there associated pain ? yes    Past 6 month menstrual history:    Cycle Frequency: vary between 30 and 60 days   Menstrual cycle character: flow is typically moderately heavy   Cycle Duration: 5 - 7   Number of heavy days of flows: 4   Dysmenorrhea: severe and affecting her activities of daily living   PMS: severe and affecting her activities of daily living   Intermenstrual bleeding present: {no   Post-coital bleeding present: no     She is sexually active.  In the past 12 months there has not been new sexual partners.  Condoms are not typically used.  She would like to be screened for STD's at  "today's exam.     No Additional Complaints Reported    The following portions of the patient's history were reviewed and updated as appropriate:problem list, current medications, allergies, past family history, past medical history, past social history and past surgical history    Social History    Tobacco Use      Smoking status: Current Every Day Smoker        Packs/day: 0.25        Types: Cigarettes      Smokeless tobacco: Never Used    Review of Systems   Constitutional: Positive for fatigue. Negative for activity change, appetite change, chills, diaphoresis, fever, unexpected weight gain and unexpected weight loss.   Respiratory: Negative for chest tightness and shortness of breath.    Cardiovascular: Negative for chest pain and palpitations.   Gastrointestinal: Negative for abdominal distention, abdominal pain, constipation, diarrhea, nausea and vomiting.   Genitourinary: Positive for menstrual problem and pelvic pain. Negative for amenorrhea, breast discharge, breast lump, breast pain, decreased libido, dyspareunia, dysuria, pelvic pressure, urinary incontinence, vaginal bleeding, vaginal discharge and vaginal pain.   Musculoskeletal: Positive for back pain and myalgias.        Has low back pain that starts prior to menses and has started to radiate down into her thighs and knees over the last 4 months. Rates pain 9/10.   Skin: Negative for color change, dry skin and skin lesions.   Neurological: Negative for light-headedness and headache.   Psychiatric/Behavioral: Positive for agitation and dysphoric mood. Negative for sleep disturbance, depressed mood and stress. The patient is not nervous/anxious.         Objective   /78   Ht 170.2 cm (67\")   Wt 103 kg (228 lb)   LMP 09/16/2020 (Exact Date)   Breastfeeding No   BMI 35.71 kg/m²     Physical Exam  Vitals signs and nursing note reviewed.   Constitutional:       General: She is awake. She is in acute distress.      Appearance: Normal appearance. She " is well-developed and well-groomed. She is obese. She is ill-appearing. She is not toxic-appearing or diaphoretic.   Cardiovascular:      Rate and Rhythm: Normal rate and regular rhythm.   Pulmonary:      Effort: Pulmonary effort is normal.      Breath sounds: Normal breath sounds.   Neurological:      Mental Status: She is alert.   Psychiatric:         Attention and Perception: Attention and perception normal.         Mood and Affect: Mood and affect normal.         Speech: Speech normal.         Behavior: Behavior normal. Behavior is cooperative.         Lab Review   No data reviewed    Imaging   Pelvic ultrasound report- done with OLLIE Hall on 8/24/2020. Uterine volume is 42.6mL; normal. Endometrial thickness (CD 11 at time of scan) was 3.4mm. Overall normal u/s.          Diagnoses and all orders for this visit:    Dysmenorrhea    Menorrhagia with irregular cycle    Pelvic pain  -     CT/NG, TV, PCR - , Urine, Random Void  -     Chlamydia trachomatis, Neisseria gonorrhoeae, PCR - , Urine, Random Void  -     Trichomonas vaginalis, PCR - ,    Other orders  -     diclofenac (VOLTAREN) 3 % gel gel; Apply  topically to the appropriate area as directed 2 (Two) Times a Day.        New Medications Ordered This Visit   Medications   • diclofenac (VOLTAREN) 3 % gel gel     Sig: Apply  topically to the appropriate area as directed 2 (Two) Times a Day.     Dispense:  100 g     Refill:  3     Given that she has had gastric bypass and is a daily smoker over the age of 33, her options for managing her painful and heavy menses are limited at this time. We did discuss a trial of a progesterone only pill vs endometrial ablation vs hysterectomy. She states that her pain is so severe that it is affecting her ability to do her job and she is ready to see about surgical intervention. She will try Voltaren topical for pain while she awaits her hysterectomy consult with Dr. Phillips. ACOG pamphlet given today. Requested last pap  results from [place], [place].     This note was electronically signed.    Jayna Briseno, APRN    September 16, 2020

## 2020-09-21 LAB
C TRACH RRNA SPEC QL NAA+PROBE: NEGATIVE
N GONORRHOEA RRNA SPEC QL NAA+PROBE: NEGATIVE
T VAGINALIS DNA SPEC QL NAA+PROBE: NEGATIVE

## 2020-10-01 ENCOUNTER — OFFICE VISIT (OUTPATIENT)
Dept: OBSTETRICS AND GYNECOLOGY | Facility: CLINIC | Age: 45
End: 2020-10-01

## 2020-10-01 VITALS
HEIGHT: 67 IN | WEIGHT: 229 LBS | DIASTOLIC BLOOD PRESSURE: 84 MMHG | SYSTOLIC BLOOD PRESSURE: 136 MMHG | BODY MASS INDEX: 35.94 KG/M2

## 2020-10-01 DIAGNOSIS — N94.6 DYSMENORRHEA: Primary | ICD-10-CM

## 2020-10-01 DIAGNOSIS — R10.2 PELVIC PAIN: ICD-10-CM

## 2020-10-01 DIAGNOSIS — N92.1 MENORRHAGIA WITH IRREGULAR CYCLE: ICD-10-CM

## 2020-10-01 PROCEDURE — 99213 OFFICE O/P EST LOW 20 MIN: CPT | Performed by: OBSTETRICS & GYNECOLOGY

## 2020-10-01 RX ORDER — CEFAZOLIN SODIUM IN 0.9 % NACL 3 G/100 ML
3 INTRAVENOUS SOLUTION, PIGGYBACK (ML) INTRAVENOUS ONCE
Status: CANCELLED | OUTPATIENT
Start: 2020-11-16 | End: 2020-10-01

## 2020-10-01 RX ORDER — ACETAMINOPHEN AND CODEINE PHOSPHATE 120; 12 MG/5ML; MG/5ML
1 SOLUTION ORAL DAILY
Qty: 28 TABLET | Refills: 12 | Status: ON HOLD | OUTPATIENT
Start: 2020-10-01 | End: 2020-11-16

## 2020-10-01 RX ORDER — SODIUM CHLORIDE, SODIUM LACTATE, POTASSIUM CHLORIDE, CALCIUM CHLORIDE 600; 310; 30; 20 MG/100ML; MG/100ML; MG/100ML; MG/100ML
125 INJECTION, SOLUTION INTRAVENOUS CONTINUOUS
Status: CANCELLED | OUTPATIENT
Start: 2020-11-16

## 2020-10-01 NOTE — PROGRESS NOTES
Candy Hernandez is a 45 y.o. y/o female.     Chief Complaint: Pelvic pain and irregular periods    HPI:   45 y.o. No obstetric history on file..  Patient's last menstrual period was 10/01/2020..  She presents for follow-up on pelvic pain and irregular periods.  Patient states that she has been having this pain for the last 9 to 10 years.  Pain has gotten worse over that time.  Patient is not a candidate for oral contraceptive pills as she is over age 33 and smokes.  Also absorption may be an issue due to gastric bypass surgery.  Patient is not able to take NSAIDs due to her gastric bypass surgery.  Patient states that the pain is constant and debilitating.  Causes her to miss work frequently and is interfering with her activities of daily living.  Periods have become very irregular and she is not certain when she will bleed how heavy it will be or how long it would last.  Pain is becoming so severe that patient is now desiring definitive management via surgery.  We will have patient do a short trial of Micronor to see if this will help although I am not certain that this will patient will likely need hysterectomy for improvement of symptoms.     Review of Systems   Constitutional: Negative for chills, fatigue and fever.   HENT: Negative for sore throat.    Eyes: Negative for visual disturbance.   Respiratory: Negative for cough, shortness of breath and wheezing.    Cardiovascular: Negative for chest pain, palpitations and leg swelling.   Gastrointestinal: Positive for abdominal pain. Negative for abdominal distention, diarrhea, nausea and vomiting.   Genitourinary: Positive for menstrual problem, pelvic pain and vaginal bleeding. Negative for dysuria, flank pain, frequency, vaginal discharge and vaginal pain.   Neurological: Negative for syncope, light-headedness and headaches.   Psychiatric/Behavioral: Negative for dysphoric mood and suicidal ideas. The patient is not nervous/anxious.         The following portions  of the patient's history were reviewed and updated as appropriate: allergies, current medications, past family history, past medical history, past social history, past surgical history and problem list.    Allergies   Allergen Reactions   • Nsaids Other (See Comments)     Hx of bariatric surgery        Prior to Admission medications    Medication Sig Start Date End Date Taking? Authorizing Provider   albuterol sulfate  (90 Base) MCG/ACT inhaler Inhale 2 puffs Every 4 (Four) Hours As Needed for Wheezing or Shortness of Air. 6/22/20  Yes Alesha Edwards APRN   Cholecalciferol (VITAMIN D3) 1.25 MG (23829 UT) capsule    Yes Otis Deluca MD   Cyanocobalamin (B-12) 1000 MCG sublingual tablet Place 1,000 mcg under the tongue Daily. 6/25/20  Yes Niki Clement MD   diclofenac (VOLTAREN) 3 % gel gel Apply  topically to the appropriate area as directed 2 (Two) Times a Day. for 60 days.   Yes Otis Deluca MD   dicyclomine (Bentyl) 20 MG tablet Take 1 tablet by mouth 3 (Three) Times a Day As Needed (abdominal cramping). 6/16/20  Yes Eladio Drummond APRN   folic acid (FOLVITE) 1 MG tablet Take 1 tablet by mouth Daily. 6/25/20  Yes Niki Clement MD   Multiple Vitamin (MULTI VITAMIN DAILY PO) Take  by mouth.   Yes ProviderOtis MD   omeprazole (priLOSEC) 40 MG capsule TAKE 1 CAPSULE BY MOUTH DAILY 7/22/20  Yes Alesha Edwards APRN   ondansetron ODT (Zofran ODT) 8 MG disintegrating tablet Place 1 tablet on the tongue Every 8 (Eight) Hours As Needed for Nausea or Vomiting. 8/26/20  Yes Eladio Drummond APRN   tiZANidine (ZANAFLEX) 2 MG tablet TAKE 1 TABLET BY MOUTH EVERY 8 HOURS AS NEEDED FOR MUSCLE SPASMS 8/28/20  Yes Eladio Drummond APRN   diclofenac (VOLTAREN) 3 % gel gel Apply  topically to the appropriate area as directed 2 (Two) Times a Day. 9/16/20 10/1/20  Jayna Briseno APRN   Fluticasone Propionate, Inhal, (Flovent Diskus) 250 MCG/BLIST aerosol powder  Inhale 1 puff  "Every 12 (Twelve) Hours.  10/1/20  Provider, MD Otis        The patient has a family history of   Family History   Problem Relation Age of Onset   • Hypertension Father    • Hypertension Mother         Past Medical History:   Diagnosis Date   • Anemia    • Asthma    • GERD (gastroesophageal reflux disease)    • Sleep apnea         OB History    No obstetric history on file.          Social History     Socioeconomic History   • Marital status: Single     Spouse name: Not on file   • Number of children: Not on file   • Years of education: Not on file   • Highest education level: Not on file   Tobacco Use   • Smoking status: Current Every Day Smoker     Packs/day: 0.25     Types: Cigarettes   • Smokeless tobacco: Never Used   Substance and Sexual Activity   • Alcohol use: Yes     Frequency: 4 or more times a week     Drinks per session: 1 or 2   • Drug use: Defer   • Sexual activity: Defer        Past Surgical History:   Procedure Laterality Date   • BARIATRIC SURGERY  05/05/2017   • LAPAROSCOPIC CHOLECYSTECTOMY     • TUBAL ABDOMINAL LIGATION          Patient Active Problem List   Diagnosis   • Alcohol use   • Smoker   • History of gastric bypass   • History of asthma   • History of fainting   • Iron deficiency anemia   • Encounter for tobacco use cessation counseling   • Asthma   • Pelvic cramping   • Flank pain   • Irregular periods/menstrual cycles   • Back spasm        Documented Vitals    10/01/20 1432   BP: 136/84   Weight: 104 kg (229 lb)   Height: 170.2 cm (67\")        Body mass index is 35.87 kg/m².    Physical Exam  Vitals signs reviewed.   Constitutional:       General: She is not in acute distress.     Appearance: Normal appearance. She is obese. She is not ill-appearing, toxic-appearing or diaphoretic.   HENT:      Head: Normocephalic.   Musculoskeletal: Normal range of motion.   Skin:     General: Skin is warm and dry.   Neurological:      General: No focal deficit present.      Mental Status: She " is alert and oriented to person, place, and time.   Psychiatric:         Mood and Affect: Mood normal.         Thought Content: Thought content normal.         Judgment: Judgment normal.         Laboratory Data:   Lab Results - Last 18 Months   Lab Units 06/16/20  1147   GLUCOSE mg/dL 82   BUN mg/dL 15   CREATININE mg/dL 0.75   SODIUM mmol/L 140   POTASSIUM mmol/L 3.9   CHLORIDE mmol/L 106   CO2 mmol/L 24.1   CALCIUM mg/dL 8.7   TOTAL PROTEIN g/dL 7.0   ALBUMIN g/dL 4.00   ALT (SGPT) U/L 16   AST (SGOT) U/L 19   ALK PHOS U/L 68   BILIRUBIN mg/dL 0.4   EGFR IF NONAFRICN AM mL/min/1.73 83   GLOBULIN gm/dL 3.0   A/G RATIO g/dL 1.3   BUN / CREAT RATIO  20.0   ANION GAP mmol/L 9.9     Lab Results - Last 18 Months   Lab Units 08/21/20  0816 06/16/20  1147   WBC 10*3/mm3 8.25 8.84   RBC 10*6/mm3 5.46* 5.27   HEMOGLOBIN g/dL 14.0 10.4*   HEMATOCRIT % 45.5 35.8   MCV fL 83.3 67.9*   MCH pg 25.6* 19.7*   MCHC g/dL 30.8* 29.1*   RDW %  --  19.8*   RDW-SD fl  --  45.6   MPV fL 10.7  --    PLATELETS 10*3/mm3 227 332     No results for input(s): HCGQUAL in the last 91054 hours.    Assessment   It was severe pelvic pain unrelieved by Tylenol and Midol unable to take estrogen-containing birth control pills does not feel comfortable taking Depo-Provera.  Will have patient trial Micronor but patient will likely need hysterectomy.  We will schedule patient for total vaginal hysterectomy on 16 November unless symptoms improve with Micronor if no improvement will proceed forward with vaginal hysterectomy.  Patient to return to see me in 4 weeks, sooner as needed.  We will do preop at that time we will also do endometrial biopsy at that time trying to get Pap smear results from previous provider if unable to obtain will need to do a Pap smear at the same time as well.     Diagnosis Plan   1. Dysmenorrhea     2. Menorrhagia with irregular cycle     3. Pelvic pain             This document has been electronically signed by Saran WHEELER  DO Jacqueline on October 1, 2020 15:06 CDT

## 2020-10-02 PROBLEM — N94.6 DYSMENORRHEA: Status: ACTIVE | Noted: 2020-10-02

## 2020-10-02 PROBLEM — R10.2 PELVIC PAIN: Status: ACTIVE | Noted: 2020-10-02

## 2020-10-02 PROBLEM — N92.1 MENORRHAGIA WITH IRREGULAR CYCLE: Status: ACTIVE | Noted: 2020-10-02

## 2020-10-22 ENCOUNTER — APPOINTMENT (OUTPATIENT)
Dept: ONCOLOGY | Facility: CLINIC | Age: 45
End: 2020-10-22

## 2020-10-22 ENCOUNTER — APPOINTMENT (OUTPATIENT)
Dept: ONCOLOGY | Facility: HOSPITAL | Age: 45
End: 2020-10-22

## 2020-11-05 ENCOUNTER — OFFICE VISIT (OUTPATIENT)
Dept: OBSTETRICS AND GYNECOLOGY | Facility: CLINIC | Age: 45
End: 2020-11-05

## 2020-11-05 ENCOUNTER — LAB (OUTPATIENT)
Dept: LAB | Facility: HOSPITAL | Age: 45
End: 2020-11-05

## 2020-11-05 VITALS
DIASTOLIC BLOOD PRESSURE: 82 MMHG | SYSTOLIC BLOOD PRESSURE: 128 MMHG | HEIGHT: 67 IN | WEIGHT: 224 LBS | BODY MASS INDEX: 35.16 KG/M2

## 2020-11-05 DIAGNOSIS — R10.2 PELVIC PAIN: Primary | ICD-10-CM

## 2020-11-05 DIAGNOSIS — N92.1 MENORRHAGIA WITH IRREGULAR CYCLE: ICD-10-CM

## 2020-11-05 DIAGNOSIS — N92.6 IRREGULAR PERIODS/MENSTRUAL CYCLES: ICD-10-CM

## 2020-11-05 DIAGNOSIS — N94.6 DYSMENORRHEA: ICD-10-CM

## 2020-11-05 DIAGNOSIS — R10.2 PELVIC CRAMPING: ICD-10-CM

## 2020-11-05 PROCEDURE — 99214 OFFICE O/P EST MOD 30 MIN: CPT | Performed by: OBSTETRICS & GYNECOLOGY

## 2020-11-05 PROCEDURE — 58100 BIOPSY OF UTERUS LINING: CPT | Performed by: OBSTETRICS & GYNECOLOGY

## 2020-11-05 NOTE — PROGRESS NOTES
Candy Hernandez is a 45 y.o. y/o female.     Chief Complaint: Pelvic pain and heavy bleeding    HPI:   45 y.o. No obstetric history on file..  No LMP recorded..  Patient presents for follow-up on pelvic pain and heavy bleeding.  States that her symptoms have not improved and she continues to have severe pain with bleeding still desires to proceed with hysterectomy at this time.  Patient to undergo endometrial biopsy to rule out any endometrial abnormalities that may prevent her from undergoing hysterectomy.  Patient has been on Micronor since last visit and is not getting any relief from the medication.  Patient also here for endometrial biopsy today.  The procedure was explained in detail to her and she desires to proceed.  Patient still desiring to proceed with vaginal hysterectomy.  Reviewed ultrasound which was normal.  Uterus normal size.     She was consented for vaginal hysterectomy with bilateral salpingectomy and cystoscopy. We discussed the risk of no surgery to include no improvement and possible, although unlikely, undiagnosed malignancy.  The risks that were discussed included, but were not limited to: Bleeding with possible risk of blood transfusion.  Blood products carry risk of HIV, infection, hepatitis, and transfusion reaction. Patient is willing to accept blood products. She understands the risk of serious urologic morbidity such as vesico vaginal fistula, damage to the ureter and/or bladder with possible need for additional surgery and low possibility of nephrectomy and/or extended morbidity. She understands the risk of damage to bowel possible colostomy. She understands the risk of damage to bowel undetected at time of procedure with sepsis and/or need returned OR. She understands the risk of delayed hemorrhage possible need to return. She understands the risk of hematoma formation. She understands the risk of infection in the abdominal wall, pelvis, abdomen and other parts of the body. She  understands the alternatives of medical therapy and the risks and alternatives. Her questions are answered at length. She understands that there is a  possibility that we may need to convert this to a total abdominal hysterectomy and she accepts this. Patient expressed understanding and desires to proceed with surgery.    Note from last visit: She presents for follow-up on pelvic pain and irregular periods.  Patient states that she has been having this pain for the last 9 to 10 years.  Pain has gotten worse over that time.  Patient is not a candidate for oral contraceptive pills as she is over age 33 and smokes.  Also absorption may be an issue due to gastric bypass surgery.  Patient is not able to take NSAIDs due to her gastric bypass surgery.  Patient states that the pain is constant and debilitating.  Causes her to miss work frequently and is interfering with her activities of daily living.  Periods have become very irregular and she is not certain when she will bleed how heavy it will be or how long it would last.  Pain is becoming so severe that patient is now desiring definitive management via surgery.  We will have patient do a short trial of Micronor to see if this will help although I am not certain that this will patient will likely need hysterectomy for improvement of symptoms.    Review of Systems   Constitutional: Negative for chills, fatigue and fever.   HENT: Negative for sore throat.    Eyes: Negative for visual disturbance.   Respiratory: Negative for cough, shortness of breath and wheezing.    Cardiovascular: Negative for chest pain, palpitations and leg swelling.   Gastrointestinal: Negative for abdominal pain, diarrhea, nausea and vomiting.   Genitourinary: Positive for menstrual problem, pelvic pain and vaginal bleeding. Negative for dysuria, flank pain, frequency, vaginal discharge and vaginal pain.   Neurological: Negative for syncope, light-headedness and headaches.   Psychiatric/Behavioral:  Negative for dysphoric mood and suicidal ideas. The patient is not nervous/anxious.         The following portions of the patient's history were reviewed and updated as appropriate: allergies, current medications, past family history, past medical history, past social history, past surgical history and problem list.    Allergies   Allergen Reactions   • Nsaids Other (See Comments)     Hx of bariatric surgery        Prior to Admission medications    Medication Sig Start Date End Date Taking? Authorizing Provider   albuterol sulfate  (90 Base) MCG/ACT inhaler Inhale 2 puffs Every 4 (Four) Hours As Needed for Wheezing or Shortness of Air. 6/22/20   Alesha Edwards APRN   Cholecalciferol (VITAMIN D3) 1.25 MG (80292 UT) capsule     ProviderOtis MD   Cyanocobalamin (B-12) 1000 MCG sublingual tablet Place 1,000 mcg under the tongue Daily. 6/25/20   Niki Clement MD   diclofenac (VOLTAREN) 3 % gel gel Apply  topically to the appropriate area as directed 2 (Two) Times a Day. for 60 days.    Otis Deluca MD   dicyclomine (Bentyl) 20 MG tablet Take 1 tablet by mouth 3 (Three) Times a Day As Needed (abdominal cramping). 6/16/20   Eladio Drummond APRN   folic acid (FOLVITE) 1 MG tablet Take 1 tablet by mouth Daily. 6/25/20   Niki Clement MD   Multiple Vitamin (MULTI VITAMIN DAILY PO) Take  by mouth.    Otis Deluca MD   norethindrone (MICRONOR) 0.35 MG tablet Take 1 tablet by mouth Daily. 10/1/20 10/1/21  Saran Phillips DO   omeprazole (priLOSEC) 40 MG capsule TAKE 1 CAPSULE BY MOUTH DAILY 7/22/20   Alesha Edwards APRN   ondansetron ODT (Zofran ODT) 8 MG disintegrating tablet Place 1 tablet on the tongue Every 8 (Eight) Hours As Needed for Nausea or Vomiting. 8/26/20   lEadio Drummond APRN   tiZANidine (ZANAFLEX) 2 MG tablet TAKE 1 TABLET BY MOUTH EVERY 8 HOURS AS NEEDED FOR MUSCLE SPASMS 8/28/20   Eladio Drummond APRN        The patient has a family history of      Family History   Problem Relation Age of Onset   • Hypertension Father    • Hypertension Mother         Past Medical History:   Diagnosis Date   • Anemia    • Asthma    • GERD (gastroesophageal reflux disease)    • Sleep apnea         OB History    No obstetric history on file.          Social History     Socioeconomic History   • Marital status: Single     Spouse name: Not on file   • Number of children: Not on file   • Years of education: Not on file   • Highest education level: Not on file   Tobacco Use   • Smoking status: Current Every Day Smoker     Packs/day: 0.25     Types: Cigarettes   • Smokeless tobacco: Never Used   Substance and Sexual Activity   • Alcohol use: Yes     Frequency: 4 or more times a week     Drinks per session: 1 or 2   • Drug use: Defer   • Sexual activity: Defer        Past Surgical History:   Procedure Laterality Date   • BARIATRIC SURGERY  05/05/2017   • LAPAROSCOPIC CHOLECYSTECTOMY     • TUBAL ABDOMINAL LIGATION          Patient Active Problem List   Diagnosis   • Alcohol use   • Smoker   • History of gastric bypass   • History of asthma   • History of fainting   • Iron deficiency anemia   • Encounter for tobacco use cessation counseling   • Asthma   • Pelvic cramping   • Flank pain   • Irregular periods/menstrual cycles   • Back spasm   • Dysmenorrhea   • Menorrhagia with irregular cycle   • Pelvic pain        There were no vitals filed for this visit.     There is no height or weight on file to calculate BMI.    Physical Exam  Vitals signs reviewed. Exam conducted with a chaperone present.   Constitutional:       General: She is not in acute distress.     Appearance: Normal appearance. She is well-developed. She is not ill-appearing, toxic-appearing or diaphoretic.   HENT:      Head: Normocephalic.   Neck:      Thyroid: No thyromegaly.   Cardiovascular:      Rate and Rhythm: Normal rate and regular rhythm.      Heart sounds: Normal heart sounds. No murmur.   Pulmonary:       Effort: Pulmonary effort is normal. No respiratory distress.      Breath sounds: Normal breath sounds. No wheezing.   Abdominal:      General: Bowel sounds are normal. There is no distension.      Palpations: Abdomen is soft. There is no mass.      Tenderness: There is no abdominal tenderness. There is no guarding or rebound.   Genitourinary:     General: Normal vulva.      Vagina: Normal. No vaginal discharge.      Comments: Cervix was visualized without difficulty.  Uterus sounded to approximately 8 to 9 cm.  Moderate amount of tissue was obtained.  Prior to doing biopsy cervix was cleaned with iodine.  Bimanual exam revealed a uterus of normal size shape and contour with no masses on the uterus or adnexa palpated.  Musculoskeletal: Normal range of motion.         General: No tenderness or deformity.   Skin:     General: Skin is warm and dry.      Findings: No erythema.   Neurological:      Mental Status: She is alert and oriented to person, place, and time.   Psychiatric:         Behavior: Behavior normal.         Thought Content: Thought content normal.         Judgment: Judgment normal.         Laboratory Data:   Lab Results - Last 18 Months   Lab Units 06/16/20  1147   GLUCOSE mg/dL 82   BUN mg/dL 15   CREATININE mg/dL 0.75   SODIUM mmol/L 140   POTASSIUM mmol/L 3.9   CHLORIDE mmol/L 106   CO2 mmol/L 24.1   CALCIUM mg/dL 8.7   TOTAL PROTEIN g/dL 7.0   ALBUMIN g/dL 4.00   ALT (SGPT) U/L 16   AST (SGOT) U/L 19   ALK PHOS U/L 68   BILIRUBIN mg/dL 0.4   EGFR IF NONAFRICN AM mL/min/1.73 83   GLOBULIN gm/dL 3.0   A/G RATIO g/dL 1.3   BUN / CREAT RATIO  20.0   ANION GAP mmol/L 9.9     Lab Results - Last 18 Months   Lab Units 08/21/20  0816 06/16/20  1147   WBC 10*3/mm3 8.25 8.84   RBC 10*6/mm3 5.46* 5.27   HEMOGLOBIN g/dL 14.0 10.4*   HEMATOCRIT % 45.5 35.8   MCV fL 83.3 67.9*   MCH pg 25.6* 19.7*   MCHC g/dL 30.8* 29.1*   RDW %  --  19.8*   RDW-SD fl  --  45.6   MPV fL 10.7  --    PLATELETS 10*3/mm3 227 332     No  results for input(s): HCGQUAL in the last 69346 hours.    Assessment   Patient with abnormal uterine bleeding and severe pelvic pain not relieved by hormonal therapy, although patient is limited in what hormonal therapies she can take.  Patient now desiring definitive management via hysterectomy.  Patient undergo total vaginal hysterectomy with bilateral salpingectomy and cystoscopy on 16 November 2020.  All questions answered patient desires to proceed.  Patient to return to see me sooner as needed.     Diagnosis Plan   1. Pelvic pain     2. Menorrhagia with irregular cycle     3. Dysmenorrhea     4. Irregular periods/menstrual cycles     5. Pelvic cramping             This document has been electronically signed by Saran Phillips DO on November 5, 2020 08:30 CST

## 2020-11-05 NOTE — H&P (VIEW-ONLY)
Candy Hernandez is a 45 y.o. y/o female.     Chief Complaint: Pelvic pain and heavy bleeding    HPI:   45 y.o. No obstetric history on file..  No LMP recorded..  Patient presents for follow-up on pelvic pain and heavy bleeding.  States that her symptoms have not improved and she continues to have severe pain with bleeding still desires to proceed with hysterectomy at this time.  Patient to undergo endometrial biopsy to rule out any endometrial abnormalities that may prevent her from undergoing hysterectomy.  Patient has been on Micronor since last visit and is not getting any relief from the medication.  Patient also here for endometrial biopsy today.  The procedure was explained in detail to her and she desires to proceed.  Patient still desiring to proceed with vaginal hysterectomy.  Reviewed ultrasound which was normal.  Uterus normal size.     She was consented for vaginal hysterectomy with bilateral salpingectomy and cystoscopy. We discussed the risk of no surgery to include no improvement and possible, although unlikely, undiagnosed malignancy.  The risks that were discussed included, but were not limited to: Bleeding with possible risk of blood transfusion.  Blood products carry risk of HIV, infection, hepatitis, and transfusion reaction. Patient is willing to accept blood products. She understands the risk of serious urologic morbidity such as vesico vaginal fistula, damage to the ureter and/or bladder with possible need for additional surgery and low possibility of nephrectomy and/or extended morbidity. She understands the risk of damage to bowel possible colostomy. She understands the risk of damage to bowel undetected at time of procedure with sepsis and/or need returned OR. She understands the risk of delayed hemorrhage possible need to return. She understands the risk of hematoma formation. She understands the risk of infection in the abdominal wall, pelvis, abdomen and other parts of the body. She  understands the alternatives of medical therapy and the risks and alternatives. Her questions are answered at length. She understands that there is a  possibility that we may need to convert this to a total abdominal hysterectomy and she accepts this. Patient expressed understanding and desires to proceed with surgery.    Note from last visit: She presents for follow-up on pelvic pain and irregular periods.  Patient states that she has been having this pain for the last 9 to 10 years.  Pain has gotten worse over that time.  Patient is not a candidate for oral contraceptive pills as she is over age 33 and smokes.  Also absorption may be an issue due to gastric bypass surgery.  Patient is not able to take NSAIDs due to her gastric bypass surgery.  Patient states that the pain is constant and debilitating.  Causes her to miss work frequently and is interfering with her activities of daily living.  Periods have become very irregular and she is not certain when she will bleed how heavy it will be or how long it would last.  Pain is becoming so severe that patient is now desiring definitive management via surgery.  We will have patient do a short trial of Micronor to see if this will help although I am not certain that this will patient will likely need hysterectomy for improvement of symptoms.    Review of Systems   Constitutional: Negative for chills, fatigue and fever.   HENT: Negative for sore throat.    Eyes: Negative for visual disturbance.   Respiratory: Negative for cough, shortness of breath and wheezing.    Cardiovascular: Negative for chest pain, palpitations and leg swelling.   Gastrointestinal: Negative for abdominal pain, diarrhea, nausea and vomiting.   Genitourinary: Positive for menstrual problem, pelvic pain and vaginal bleeding. Negative for dysuria, flank pain, frequency, vaginal discharge and vaginal pain.   Neurological: Negative for syncope, light-headedness and headaches.   Psychiatric/Behavioral:  Negative for dysphoric mood and suicidal ideas. The patient is not nervous/anxious.         The following portions of the patient's history were reviewed and updated as appropriate: allergies, current medications, past family history, past medical history, past social history, past surgical history and problem list.    Allergies   Allergen Reactions   • Nsaids Other (See Comments)     Hx of bariatric surgery        Prior to Admission medications    Medication Sig Start Date End Date Taking? Authorizing Provider   albuterol sulfate  (90 Base) MCG/ACT inhaler Inhale 2 puffs Every 4 (Four) Hours As Needed for Wheezing or Shortness of Air. 6/22/20   Alesha Edwards APRN   Cholecalciferol (VITAMIN D3) 1.25 MG (72474 UT) capsule     ProviderOtis MD   Cyanocobalamin (B-12) 1000 MCG sublingual tablet Place 1,000 mcg under the tongue Daily. 6/25/20   Niki Clement MD   diclofenac (VOLTAREN) 3 % gel gel Apply  topically to the appropriate area as directed 2 (Two) Times a Day. for 60 days.    Otis Deluca MD   dicyclomine (Bentyl) 20 MG tablet Take 1 tablet by mouth 3 (Three) Times a Day As Needed (abdominal cramping). 6/16/20   Eladio Drummond APRN   folic acid (FOLVITE) 1 MG tablet Take 1 tablet by mouth Daily. 6/25/20   Niki Clement MD   Multiple Vitamin (MULTI VITAMIN DAILY PO) Take  by mouth.    Otis Deluca MD   norethindrone (MICRONOR) 0.35 MG tablet Take 1 tablet by mouth Daily. 10/1/20 10/1/21  Saran Phillips DO   omeprazole (priLOSEC) 40 MG capsule TAKE 1 CAPSULE BY MOUTH DAILY 7/22/20   Alesha Edwards APRN   ondansetron ODT (Zofran ODT) 8 MG disintegrating tablet Place 1 tablet on the tongue Every 8 (Eight) Hours As Needed for Nausea or Vomiting. 8/26/20   Eladio Drummond APRN   tiZANidine (ZANAFLEX) 2 MG tablet TAKE 1 TABLET BY MOUTH EVERY 8 HOURS AS NEEDED FOR MUSCLE SPASMS 8/28/20   Eladio Drummond APRN        The patient has a family history of      Family History   Problem Relation Age of Onset   • Hypertension Father    • Hypertension Mother         Past Medical History:   Diagnosis Date   • Anemia    • Asthma    • GERD (gastroesophageal reflux disease)    • Sleep apnea         OB History    No obstetric history on file.          Social History     Socioeconomic History   • Marital status: Single     Spouse name: Not on file   • Number of children: Not on file   • Years of education: Not on file   • Highest education level: Not on file   Tobacco Use   • Smoking status: Current Every Day Smoker     Packs/day: 0.25     Types: Cigarettes   • Smokeless tobacco: Never Used   Substance and Sexual Activity   • Alcohol use: Yes     Frequency: 4 or more times a week     Drinks per session: 1 or 2   • Drug use: Defer   • Sexual activity: Defer        Past Surgical History:   Procedure Laterality Date   • BARIATRIC SURGERY  05/05/2017   • LAPAROSCOPIC CHOLECYSTECTOMY     • TUBAL ABDOMINAL LIGATION          Patient Active Problem List   Diagnosis   • Alcohol use   • Smoker   • History of gastric bypass   • History of asthma   • History of fainting   • Iron deficiency anemia   • Encounter for tobacco use cessation counseling   • Asthma   • Pelvic cramping   • Flank pain   • Irregular periods/menstrual cycles   • Back spasm   • Dysmenorrhea   • Menorrhagia with irregular cycle   • Pelvic pain        There were no vitals filed for this visit.     There is no height or weight on file to calculate BMI.    Physical Exam  Vitals signs reviewed. Exam conducted with a chaperone present.   Constitutional:       General: She is not in acute distress.     Appearance: Normal appearance. She is well-developed. She is not ill-appearing, toxic-appearing or diaphoretic.   HENT:      Head: Normocephalic.   Neck:      Thyroid: No thyromegaly.   Cardiovascular:      Rate and Rhythm: Normal rate and regular rhythm.      Heart sounds: Normal heart sounds. No murmur.   Pulmonary:       Effort: Pulmonary effort is normal. No respiratory distress.      Breath sounds: Normal breath sounds. No wheezing.   Abdominal:      General: Bowel sounds are normal. There is no distension.      Palpations: Abdomen is soft. There is no mass.      Tenderness: There is no abdominal tenderness. There is no guarding or rebound.   Genitourinary:     General: Normal vulva.      Vagina: Normal. No vaginal discharge.      Comments: Cervix was visualized without difficulty.  Uterus sounded to approximately 8 to 9 cm.  Moderate amount of tissue was obtained.  Prior to doing biopsy cervix was cleaned with iodine.  Bimanual exam revealed a uterus of normal size shape and contour with no masses on the uterus or adnexa palpated.  Musculoskeletal: Normal range of motion.         General: No tenderness or deformity.   Skin:     General: Skin is warm and dry.      Findings: No erythema.   Neurological:      Mental Status: She is alert and oriented to person, place, and time.   Psychiatric:         Behavior: Behavior normal.         Thought Content: Thought content normal.         Judgment: Judgment normal.         Laboratory Data:   Lab Results - Last 18 Months   Lab Units 06/16/20  1147   GLUCOSE mg/dL 82   BUN mg/dL 15   CREATININE mg/dL 0.75   SODIUM mmol/L 140   POTASSIUM mmol/L 3.9   CHLORIDE mmol/L 106   CO2 mmol/L 24.1   CALCIUM mg/dL 8.7   TOTAL PROTEIN g/dL 7.0   ALBUMIN g/dL 4.00   ALT (SGPT) U/L 16   AST (SGOT) U/L 19   ALK PHOS U/L 68   BILIRUBIN mg/dL 0.4   EGFR IF NONAFRICN AM mL/min/1.73 83   GLOBULIN gm/dL 3.0   A/G RATIO g/dL 1.3   BUN / CREAT RATIO  20.0   ANION GAP mmol/L 9.9     Lab Results - Last 18 Months   Lab Units 08/21/20  0816 06/16/20  1147   WBC 10*3/mm3 8.25 8.84   RBC 10*6/mm3 5.46* 5.27   HEMOGLOBIN g/dL 14.0 10.4*   HEMATOCRIT % 45.5 35.8   MCV fL 83.3 67.9*   MCH pg 25.6* 19.7*   MCHC g/dL 30.8* 29.1*   RDW %  --  19.8*   RDW-SD fl  --  45.6   MPV fL 10.7  --    PLATELETS 10*3/mm3 227 332     No  results for input(s): HCGQUAL in the last 28207 hours.    Assessment   Patient with abnormal uterine bleeding and severe pelvic pain not relieved by hormonal therapy, although patient is limited in what hormonal therapies she can take.  Patient now desiring definitive management via hysterectomy.  Patient undergo total vaginal hysterectomy with bilateral salpingectomy and cystoscopy on 16 November 2020.  All questions answered patient desires to proceed.  Patient to return to see me sooner as needed.     Diagnosis Plan   1. Pelvic pain     2. Menorrhagia with irregular cycle     3. Dysmenorrhea     4. Irregular periods/menstrual cycles     5. Pelvic cramping             This document has been electronically signed by Saran Phillips DO on November 5, 2020 08:30 CST

## 2020-11-09 ENCOUNTER — TELEPHONE (OUTPATIENT)
Dept: OBSTETRICS AND GYNECOLOGY | Facility: CLINIC | Age: 45
End: 2020-11-09

## 2020-11-10 LAB
LAB AP CASE REPORT: NORMAL
LAB AP CLINICAL INFORMATION: NORMAL
PATH REPORT.FINAL DX SPEC: NORMAL

## 2020-11-12 LAB
LAB AP CASE REPORT: NORMAL
PATH INTERP SPEC-IMP: NORMAL

## 2020-11-13 ENCOUNTER — LAB (OUTPATIENT)
Dept: LAB | Facility: HOSPITAL | Age: 45
End: 2020-11-13

## 2020-11-13 DIAGNOSIS — Z01.818 PREOP TESTING: Primary | ICD-10-CM

## 2020-11-13 PROCEDURE — U0003 INFECTIOUS AGENT DETECTION BY NUCLEIC ACID (DNA OR RNA); SEVERE ACUTE RESPIRATORY SYNDROME CORONAVIRUS 2 (SARS-COV-2) (CORONAVIRUS DISEASE [COVID-19]), AMPLIFIED PROBE TECHNIQUE, MAKING USE OF HIGH THROUGHPUT TECHNOLOGIES AS DESCRIBED BY CMS-2020-01-R: HCPCS

## 2020-11-13 PROCEDURE — C9803 HOPD COVID-19 SPEC COLLECT: HCPCS

## 2020-11-14 LAB
COVID LABCORP PRIORITY: NORMAL
SARS-COV-2 RNA RESP QL NAA+PROBE: NOT DETECTED

## 2020-11-16 ENCOUNTER — ANESTHESIA EVENT (OUTPATIENT)
Dept: PERIOP | Facility: HOSPITAL | Age: 45
End: 2020-11-16

## 2020-11-16 ENCOUNTER — ANESTHESIA (OUTPATIENT)
Dept: PERIOP | Facility: HOSPITAL | Age: 45
End: 2020-11-16

## 2020-11-16 ENCOUNTER — HOSPITAL ENCOUNTER (OUTPATIENT)
Facility: HOSPITAL | Age: 45
Discharge: HOME | End: 2020-11-17
Attending: OBSTETRICS & GYNECOLOGY | Admitting: OBSTETRICS & GYNECOLOGY

## 2020-11-16 DIAGNOSIS — N94.6 DYSMENORRHEA: ICD-10-CM

## 2020-11-16 DIAGNOSIS — Z90.710 STATUS POST VAGINAL HYSTERECTOMY: Primary | ICD-10-CM

## 2020-11-16 DIAGNOSIS — R10.2 PELVIC PAIN: ICD-10-CM

## 2020-11-16 DIAGNOSIS — N92.1 MENORRHAGIA WITH IRREGULAR CYCLE: ICD-10-CM

## 2020-11-16 LAB
ABO GROUP BLD: NORMAL
ANION GAP SERPL CALCULATED.3IONS-SCNC: 11 MMOL/L (ref 5–15)
B-HCG UR QL: NEGATIVE
BASOPHILS # BLD AUTO: 0.02 10*3/MM3 (ref 0–0.2)
BASOPHILS NFR BLD AUTO: 0.2 % (ref 0–1.5)
BLD GP AB SCN SERPL QL: NEGATIVE
BUN SERPL-MCNC: 12 MG/DL (ref 6–20)
BUN/CREAT SERPL: 19.7 (ref 7–25)
CALCIUM SPEC-SCNC: 9.2 MG/DL (ref 8.6–10.5)
CHLORIDE SERPL-SCNC: 104 MMOL/L (ref 98–107)
CO2 SERPL-SCNC: 23 MMOL/L (ref 22–29)
CREAT SERPL-MCNC: 0.61 MG/DL (ref 0.57–1)
DEPRECATED RDW RBC AUTO: 44.7 FL (ref 37–54)
EOSINOPHIL # BLD AUTO: 0.11 10*3/MM3 (ref 0–0.4)
EOSINOPHIL NFR BLD AUTO: 1.2 % (ref 0.3–6.2)
ERYTHROCYTE [DISTWIDTH] IN BLOOD BY AUTOMATED COUNT: 13.5 % (ref 12.3–15.4)
GFR SERPL CREATININE-BSD FRML MDRD: 127 ML/MIN/1.73
GLUCOSE SERPL-MCNC: 86 MG/DL (ref 65–99)
HCT VFR BLD AUTO: 43.8 % (ref 34–46.6)
HGB BLD-MCNC: 14.2 G/DL (ref 12–15.9)
IMM GRANULOCYTES # BLD AUTO: 0.03 10*3/MM3 (ref 0–0.05)
IMM GRANULOCYTES NFR BLD AUTO: 0.3 % (ref 0–0.5)
LYMPHOCYTES # BLD AUTO: 1.91 10*3/MM3 (ref 0.7–3.1)
LYMPHOCYTES NFR BLD AUTO: 20.9 % (ref 19.6–45.3)
Lab: NORMAL
MCH RBC QN AUTO: 29.3 PG (ref 26.6–33)
MCHC RBC AUTO-ENTMCNC: 32.4 G/DL (ref 31.5–35.7)
MCV RBC AUTO: 90.3 FL (ref 79–97)
MONOCYTES # BLD AUTO: 0.57 10*3/MM3 (ref 0.1–0.9)
MONOCYTES NFR BLD AUTO: 6.2 % (ref 5–12)
NEUTROPHILS NFR BLD AUTO: 6.52 10*3/MM3 (ref 1.7–7)
NEUTROPHILS NFR BLD AUTO: 71.2 % (ref 42.7–76)
NRBC BLD AUTO-RTO: 0 /100 WBC (ref 0–0.2)
PLATELET # BLD AUTO: 240 10*3/MM3 (ref 140–450)
PMV BLD AUTO: 9.8 FL (ref 6–12)
POTASSIUM SERPL-SCNC: 3.4 MMOL/L (ref 3.5–5.2)
RBC # BLD AUTO: 4.85 10*6/MM3 (ref 3.77–5.28)
RH BLD: POSITIVE
SODIUM SERPL-SCNC: 138 MMOL/L (ref 136–145)
T&S EXPIRATION DATE: NORMAL
WBC # BLD AUTO: 9.16 10*3/MM3 (ref 3.4–10.8)

## 2020-11-16 PROCEDURE — 25010000002 DEXAMETHASONE PER 1 MG: Performed by: NURSE ANESTHETIST, CERTIFIED REGISTERED

## 2020-11-16 PROCEDURE — 81025 URINE PREGNANCY TEST: CPT | Performed by: OBSTETRICS & GYNECOLOGY

## 2020-11-16 PROCEDURE — 80048 BASIC METABOLIC PNL TOTAL CA: CPT | Performed by: OBSTETRICS & GYNECOLOGY

## 2020-11-16 PROCEDURE — 94799 UNLISTED PULMONARY SVC/PX: CPT

## 2020-11-16 PROCEDURE — 25010000003 MEPERIDINE PER 100 MG: Performed by: NURSE ANESTHETIST, CERTIFIED REGISTERED

## 2020-11-16 PROCEDURE — 25010000002 NEOSTIGMINE 4 MG/4ML SOLUTION PREFILLED SYRINGE: Performed by: NURSE ANESTHETIST, CERTIFIED REGISTERED

## 2020-11-16 PROCEDURE — 86901 BLOOD TYPING SEROLOGIC RH(D): CPT | Performed by: OBSTETRICS & GYNECOLOGY

## 2020-11-16 PROCEDURE — 25010000002 FENTANYL CITRATE (PF) 100 MCG/2ML SOLUTION: Performed by: NURSE ANESTHETIST, CERTIFIED REGISTERED

## 2020-11-16 PROCEDURE — 86850 RBC ANTIBODY SCREEN: CPT | Performed by: OBSTETRICS & GYNECOLOGY

## 2020-11-16 PROCEDURE — 58260 VAGINAL HYSTERECTOMY: CPT | Performed by: SPECIALIST/TECHNOLOGIST, OTHER

## 2020-11-16 PROCEDURE — 94640 AIRWAY INHALATION TREATMENT: CPT

## 2020-11-16 PROCEDURE — G0378 HOSPITAL OBSERVATION PER HR: HCPCS

## 2020-11-16 PROCEDURE — 87086 URINE CULTURE/COLONY COUNT: CPT | Performed by: OBSTETRICS & GYNECOLOGY

## 2020-11-16 PROCEDURE — 58260 VAGINAL HYSTERECTOMY: CPT | Performed by: OBSTETRICS & GYNECOLOGY

## 2020-11-16 PROCEDURE — 25010000002 HYDROMORPHONE 1 MG/ML SOLUTION: Performed by: NURSE ANESTHETIST, CERTIFIED REGISTERED

## 2020-11-16 PROCEDURE — 25010000002 HYDROMORPHONE 1 MG/ML SOLUTION: Performed by: OBSTETRICS & GYNECOLOGY

## 2020-11-16 PROCEDURE — 85025 COMPLETE CBC W/AUTO DIFF WBC: CPT | Performed by: OBSTETRICS & GYNECOLOGY

## 2020-11-16 PROCEDURE — 25010000002 MIDAZOLAM PER 1 MG: Performed by: NURSE ANESTHETIST, CERTIFIED REGISTERED

## 2020-11-16 PROCEDURE — 25010000002 ONDANSETRON PER 1 MG: Performed by: NURSE ANESTHETIST, CERTIFIED REGISTERED

## 2020-11-16 PROCEDURE — 25010000002 SUCCINYLCHOLINE PER 20 MG: Performed by: NURSE ANESTHETIST, CERTIFIED REGISTERED

## 2020-11-16 PROCEDURE — 87077 CULTURE AEROBIC IDENTIFY: CPT | Performed by: OBSTETRICS & GYNECOLOGY

## 2020-11-16 PROCEDURE — 25010000002 HYDROMORPHONE PER 4 MG: Performed by: NURSE ANESTHETIST, CERTIFIED REGISTERED

## 2020-11-16 PROCEDURE — 87186 SC STD MICRODIL/AGAR DIL: CPT | Performed by: OBSTETRICS & GYNECOLOGY

## 2020-11-16 PROCEDURE — 86900 BLOOD TYPING SEROLOGIC ABO: CPT | Performed by: OBSTETRICS & GYNECOLOGY

## 2020-11-16 PROCEDURE — 25010000002 PROPOFOL 10 MG/ML EMULSION: Performed by: NURSE ANESTHETIST, CERTIFIED REGISTERED

## 2020-11-16 PROCEDURE — 25010000002 CEFAZOLIN PER 500 MG: Performed by: OBSTETRICS & GYNECOLOGY

## 2020-11-16 RX ORDER — BISACODYL 10 MG
10 SUPPOSITORY, RECTAL RECTAL DAILY PRN
Status: DISCONTINUED | OUTPATIENT
Start: 2020-11-16 | End: 2020-11-17 | Stop reason: HOSPADM

## 2020-11-16 RX ORDER — NALOXONE HCL 0.4 MG/ML
0.4 VIAL (ML) INJECTION AS NEEDED
Status: DISCONTINUED | OUTPATIENT
Start: 2020-11-16 | End: 2020-11-16 | Stop reason: HOSPADM

## 2020-11-16 RX ORDER — ACETAMINOPHEN 650 MG/1
650 SUPPOSITORY RECTAL ONCE AS NEEDED
Status: DISCONTINUED | OUTPATIENT
Start: 2020-11-16 | End: 2020-11-16 | Stop reason: HOSPADM

## 2020-11-16 RX ORDER — FENTANYL CITRATE 50 UG/ML
INJECTION, SOLUTION INTRAMUSCULAR; INTRAVENOUS AS NEEDED
Status: DISCONTINUED | OUTPATIENT
Start: 2020-11-16 | End: 2020-11-16 | Stop reason: SURG

## 2020-11-16 RX ORDER — SUCCINYLCHOLINE CHLORIDE 20 MG/ML
INJECTION INTRAMUSCULAR; INTRAVENOUS AS NEEDED
Status: DISCONTINUED | OUTPATIENT
Start: 2020-11-16 | End: 2020-11-16 | Stop reason: SURG

## 2020-11-16 RX ORDER — LIDOCAINE HYDROCHLORIDE 20 MG/ML
INJECTION, SOLUTION INFILTRATION; PERINEURAL AS NEEDED
Status: DISCONTINUED | OUTPATIENT
Start: 2020-11-16 | End: 2020-11-16 | Stop reason: SURG

## 2020-11-16 RX ORDER — PROMETHAZINE HYDROCHLORIDE 25 MG/1
25 TABLET ORAL ONCE AS NEEDED
Status: DISCONTINUED | OUTPATIENT
Start: 2020-11-16 | End: 2020-11-16 | Stop reason: HOSPADM

## 2020-11-16 RX ORDER — LABETALOL HYDROCHLORIDE 5 MG/ML
5 INJECTION, SOLUTION INTRAVENOUS
Status: DISCONTINUED | OUTPATIENT
Start: 2020-11-16 | End: 2020-11-16 | Stop reason: HOSPADM

## 2020-11-16 RX ORDER — MEPERIDINE HYDROCHLORIDE 25 MG/ML
12.5 INJECTION INTRAMUSCULAR; INTRAVENOUS; SUBCUTANEOUS
Status: COMPLETED | OUTPATIENT
Start: 2020-11-16 | End: 2020-11-16

## 2020-11-16 RX ORDER — MIDAZOLAM HYDROCHLORIDE 1 MG/ML
INJECTION INTRAMUSCULAR; INTRAVENOUS AS NEEDED
Status: DISCONTINUED | OUTPATIENT
Start: 2020-11-16 | End: 2020-11-16 | Stop reason: SURG

## 2020-11-16 RX ORDER — CEFAZOLIN SODIUM IN 0.9 % NACL 3 G/100 ML
3 INTRAVENOUS SOLUTION, PIGGYBACK (ML) INTRAVENOUS ONCE
Status: COMPLETED | OUTPATIENT
Start: 2020-11-16 | End: 2020-11-16

## 2020-11-16 RX ORDER — PROMETHAZINE HYDROCHLORIDE 12.5 MG/1
12.5 TABLET ORAL EVERY 6 HOURS PRN
Status: DISCONTINUED | OUTPATIENT
Start: 2020-11-16 | End: 2020-11-17 | Stop reason: HOSPADM

## 2020-11-16 RX ORDER — ONDANSETRON 2 MG/ML
INJECTION INTRAMUSCULAR; INTRAVENOUS AS NEEDED
Status: DISCONTINUED | OUTPATIENT
Start: 2020-11-16 | End: 2020-11-16 | Stop reason: SURG

## 2020-11-16 RX ORDER — ACETAMINOPHEN 500 MG
1000 TABLET ORAL EVERY 8 HOURS
Status: DISCONTINUED | OUTPATIENT
Start: 2020-11-16 | End: 2020-11-17 | Stop reason: HOSPADM

## 2020-11-16 RX ORDER — DIPHENHYDRAMINE HYDROCHLORIDE 50 MG/ML
12.5 INJECTION INTRAMUSCULAR; INTRAVENOUS
Status: DISCONTINUED | OUTPATIENT
Start: 2020-11-16 | End: 2020-11-16 | Stop reason: HOSPADM

## 2020-11-16 RX ORDER — SODIUM CHLORIDE, SODIUM LACTATE, POTASSIUM CHLORIDE, CALCIUM CHLORIDE 600; 310; 30; 20 MG/100ML; MG/100ML; MG/100ML; MG/100ML
125 INJECTION, SOLUTION INTRAVENOUS CONTINUOUS
Status: DISCONTINUED | OUTPATIENT
Start: 2020-11-16 | End: 2020-11-17 | Stop reason: HOSPADM

## 2020-11-16 RX ORDER — NEOSTIGMINE METHYLSULFATE 4 MG/4 ML
SYRINGE (ML) INTRAVENOUS AS NEEDED
Status: DISCONTINUED | OUTPATIENT
Start: 2020-11-16 | End: 2020-11-16 | Stop reason: SURG

## 2020-11-16 RX ORDER — OXYCODONE HYDROCHLORIDE 5 MG/1
10 TABLET ORAL EVERY 4 HOURS PRN
Status: DISCONTINUED | OUTPATIENT
Start: 2020-11-16 | End: 2020-11-17

## 2020-11-16 RX ORDER — SODIUM CHLORIDE, SODIUM LACTATE, POTASSIUM CHLORIDE, CALCIUM CHLORIDE 600; 310; 30; 20 MG/100ML; MG/100ML; MG/100ML; MG/100ML
125 INJECTION, SOLUTION INTRAVENOUS CONTINUOUS
Status: DISCONTINUED | OUTPATIENT
Start: 2020-11-16 | End: 2020-11-16

## 2020-11-16 RX ORDER — OXYCODONE HYDROCHLORIDE 5 MG/1
5 TABLET ORAL EVERY 4 HOURS PRN
Status: DISCONTINUED | OUTPATIENT
Start: 2020-11-16 | End: 2020-11-16

## 2020-11-16 RX ORDER — POLYETHYLENE GLYCOL 3350 17 G/17G
17 POWDER, FOR SOLUTION ORAL DAILY
Status: DISCONTINUED | OUTPATIENT
Start: 2020-11-16 | End: 2020-11-17 | Stop reason: HOSPADM

## 2020-11-16 RX ORDER — PROMETHAZINE HYDROCHLORIDE 12.5 MG/1
12.5 SUPPOSITORY RECTAL EVERY 6 HOURS PRN
Status: DISCONTINUED | OUTPATIENT
Start: 2020-11-16 | End: 2020-11-17 | Stop reason: HOSPADM

## 2020-11-16 RX ORDER — PROMETHAZINE HYDROCHLORIDE 25 MG/1
25 SUPPOSITORY RECTAL ONCE AS NEEDED
Status: DISCONTINUED | OUTPATIENT
Start: 2020-11-16 | End: 2020-11-16 | Stop reason: HOSPADM

## 2020-11-16 RX ORDER — SODIUM CHLORIDE, SODIUM LACTATE, POTASSIUM CHLORIDE, CALCIUM CHLORIDE 600; 310; 30; 20 MG/100ML; MG/100ML; MG/100ML; MG/100ML
INJECTION, SOLUTION INTRAVENOUS CONTINUOUS PRN
Status: DISCONTINUED | OUTPATIENT
Start: 2020-11-16 | End: 2020-11-16 | Stop reason: SURG

## 2020-11-16 RX ORDER — EPHEDRINE SULFATE 50 MG/ML
5 INJECTION, SOLUTION INTRAVENOUS ONCE AS NEEDED
Status: DISCONTINUED | OUTPATIENT
Start: 2020-11-16 | End: 2020-11-16 | Stop reason: HOSPADM

## 2020-11-16 RX ORDER — PROPOFOL 10 MG/ML
VIAL (ML) INTRAVENOUS AS NEEDED
Status: DISCONTINUED | OUTPATIENT
Start: 2020-11-16 | End: 2020-11-16 | Stop reason: SURG

## 2020-11-16 RX ORDER — DEXAMETHASONE SODIUM PHOSPHATE 4 MG/ML
INJECTION, SOLUTION INTRA-ARTICULAR; INTRALESIONAL; INTRAMUSCULAR; INTRAVENOUS; SOFT TISSUE AS NEEDED
Status: DISCONTINUED | OUTPATIENT
Start: 2020-11-16 | End: 2020-11-16 | Stop reason: SURG

## 2020-11-16 RX ORDER — FLUMAZENIL 0.1 MG/ML
0.2 INJECTION INTRAVENOUS AS NEEDED
Status: DISCONTINUED | OUTPATIENT
Start: 2020-11-16 | End: 2020-11-16 | Stop reason: HOSPADM

## 2020-11-16 RX ORDER — ACETAMINOPHEN 325 MG/1
650 TABLET ORAL ONCE AS NEEDED
Status: DISCONTINUED | OUTPATIENT
Start: 2020-11-16 | End: 2020-11-16 | Stop reason: HOSPADM

## 2020-11-16 RX ORDER — ONDANSETRON 2 MG/ML
4 INJECTION INTRAMUSCULAR; INTRAVENOUS ONCE AS NEEDED
Status: DISCONTINUED | OUTPATIENT
Start: 2020-11-16 | End: 2020-11-16 | Stop reason: HOSPADM

## 2020-11-16 RX ORDER — NALOXONE HCL 0.4 MG/ML
0.1 VIAL (ML) INJECTION
Status: DISCONTINUED | OUTPATIENT
Start: 2020-11-16 | End: 2020-11-17 | Stop reason: HOSPADM

## 2020-11-16 RX ORDER — BUPIVACAINE HCL/0.9 % NACL/PF 0.1 %
2 PLASTIC BAG, INJECTION (ML) EPIDURAL EVERY 8 HOURS
Status: COMPLETED | OUTPATIENT
Start: 2020-11-16 | End: 2020-11-17

## 2020-11-16 RX ORDER — ALBUTEROL SULFATE 2.5 MG/3ML
2.5 SOLUTION RESPIRATORY (INHALATION) EVERY 4 HOURS PRN
Status: DISCONTINUED | OUTPATIENT
Start: 2020-11-16 | End: 2020-11-17 | Stop reason: HOSPADM

## 2020-11-16 RX ORDER — ROCURONIUM BROMIDE 10 MG/ML
INJECTION, SOLUTION INTRAVENOUS AS NEEDED
Status: DISCONTINUED | OUTPATIENT
Start: 2020-11-16 | End: 2020-11-16 | Stop reason: SURG

## 2020-11-16 RX ORDER — ONDANSETRON 4 MG/1
4 TABLET, FILM COATED ORAL EVERY 6 HOURS PRN
Status: DISCONTINUED | OUTPATIENT
Start: 2020-11-16 | End: 2020-11-17 | Stop reason: HOSPADM

## 2020-11-16 RX ORDER — ONDANSETRON 2 MG/ML
4 INJECTION INTRAMUSCULAR; INTRAVENOUS EVERY 6 HOURS PRN
Status: DISCONTINUED | OUTPATIENT
Start: 2020-11-16 | End: 2020-11-17 | Stop reason: HOSPADM

## 2020-11-16 RX ORDER — NEOSTIGMINE METHYLSULFATE 4 MG/4 ML
SYRINGE (ML) INTRAVENOUS AS NEEDED
Status: DISCONTINUED | OUTPATIENT
Start: 2020-11-16 | End: 2020-11-16

## 2020-11-16 RX ORDER — HYDROMORPHONE HCL 110MG/55ML
PATIENT CONTROLLED ANALGESIA SYRINGE INTRAVENOUS AS NEEDED
Status: DISCONTINUED | OUTPATIENT
Start: 2020-11-16 | End: 2020-11-16 | Stop reason: SURG

## 2020-11-16 RX ADMIN — MEPERIDINE HYDROCHLORIDE 12.5 MG: 25 INJECTION, SOLUTION INTRAMUSCULAR; INTRAVENOUS; SUBCUTANEOUS at 09:47

## 2020-11-16 RX ADMIN — ALBUTEROL SULFATE 2.5 MG: 2.5 SOLUTION RESPIRATORY (INHALATION) at 15:52

## 2020-11-16 RX ADMIN — HYDROMORPHONE HYDROCHLORIDE 0.5 MG: 1 INJECTION, SOLUTION INTRAMUSCULAR; INTRAVENOUS; SUBCUTANEOUS at 10:07

## 2020-11-16 RX ADMIN — SODIUM CHLORIDE, POTASSIUM CHLORIDE, SODIUM LACTATE AND CALCIUM CHLORIDE: 600; 310; 30; 20 INJECTION, SOLUTION INTRAVENOUS at 07:12

## 2020-11-16 RX ADMIN — SODIUM CHLORIDE, POTASSIUM CHLORIDE, SODIUM LACTATE AND CALCIUM CHLORIDE 125 ML/HR: 600; 310; 30; 20 INJECTION, SOLUTION INTRAVENOUS at 10:20

## 2020-11-16 RX ADMIN — PROPOFOL 160 MG: 10 INJECTION, EMULSION INTRAVENOUS at 07:24

## 2020-11-16 RX ADMIN — OXYCODONE 5 MG: 5 TABLET ORAL at 11:10

## 2020-11-16 RX ADMIN — DEXAMETHASONE SODIUM PHOSPHATE 4 MG: 4 INJECTION, SOLUTION INTRAMUSCULAR; INTRAVENOUS at 07:34

## 2020-11-16 RX ADMIN — HYDROMORPHONE HYDROCHLORIDE 0.5 MG: 1 INJECTION, SOLUTION INTRAMUSCULAR; INTRAVENOUS; SUBCUTANEOUS at 13:01

## 2020-11-16 RX ADMIN — MIDAZOLAM HYDROCHLORIDE 2 MG: 2 INJECTION, SOLUTION INTRAMUSCULAR; INTRAVENOUS at 07:10

## 2020-11-16 RX ADMIN — HYDROMORPHONE HYDROCHLORIDE 0.5 MG: 2 INJECTION, SOLUTION INTRAMUSCULAR; INTRAVENOUS; SUBCUTANEOUS at 08:58

## 2020-11-16 RX ADMIN — MEPERIDINE HYDROCHLORIDE 12.5 MG: 25 INJECTION, SOLUTION INTRAMUSCULAR; INTRAVENOUS; SUBCUTANEOUS at 10:02

## 2020-11-16 RX ADMIN — PROPOFOL 40 MG: 10 INJECTION, EMULSION INTRAVENOUS at 07:26

## 2020-11-16 RX ADMIN — ONDANSETRON 4 MG: 2 INJECTION INTRAMUSCULAR; INTRAVENOUS at 09:00

## 2020-11-16 RX ADMIN — CEFAZOLIN SODIUM 2 G: 10 INJECTION, POWDER, FOR SOLUTION INTRAVENOUS at 23:45

## 2020-11-16 RX ADMIN — ROCURONIUM BROMIDE 25 MG: 10 INJECTION INTRAVENOUS at 07:35

## 2020-11-16 RX ADMIN — HYDROMORPHONE HYDROCHLORIDE 0.5 MG: 1 INJECTION, SOLUTION INTRAMUSCULAR; INTRAVENOUS; SUBCUTANEOUS at 10:40

## 2020-11-16 RX ADMIN — HYDROMORPHONE HYDROCHLORIDE 0.5 MG: 2 INJECTION, SOLUTION INTRAMUSCULAR; INTRAVENOUS; SUBCUTANEOUS at 09:06

## 2020-11-16 RX ADMIN — OXYCODONE 10 MG: 5 TABLET ORAL at 16:28

## 2020-11-16 RX ADMIN — HYDROMORPHONE HYDROCHLORIDE 0.5 MG: 1 INJECTION, SOLUTION INTRAMUSCULAR; INTRAVENOUS; SUBCUTANEOUS at 09:37

## 2020-11-16 RX ADMIN — ROCURONIUM BROMIDE 5 MG: 10 INJECTION INTRAVENOUS at 07:24

## 2020-11-16 RX ADMIN — ACETAMINOPHEN 1000 MG: 500 TABLET ORAL at 11:09

## 2020-11-16 RX ADMIN — Medication 3 MG: at 09:16

## 2020-11-16 RX ADMIN — FLUORESCEIN SODIUM 0.25 ML: 100 INJECTION INTRAVENOUS at 08:56

## 2020-11-16 RX ADMIN — SUCCINYLCHOLINE CHLORIDE 120 MG: 20 INJECTION, SOLUTION INTRAMUSCULAR; INTRAVENOUS at 07:24

## 2020-11-16 RX ADMIN — CEFAZOLIN 3 G: 1 INJECTION, POWDER, FOR SOLUTION INTRAMUSCULAR; INTRAVENOUS; PARENTERAL at 07:35

## 2020-11-16 RX ADMIN — OXYCODONE 10 MG: 5 TABLET ORAL at 20:40

## 2020-11-16 RX ADMIN — ROCURONIUM BROMIDE 10 MG: 10 INJECTION INTRAVENOUS at 08:08

## 2020-11-16 RX ADMIN — HYDROMORPHONE HYDROCHLORIDE 0.5 MG: 1 INJECTION, SOLUTION INTRAMUSCULAR; INTRAVENOUS; SUBCUTANEOUS at 09:50

## 2020-11-16 RX ADMIN — CEFAZOLIN SODIUM 2 G: 10 INJECTION, POWDER, FOR SOLUTION INTRAVENOUS at 16:29

## 2020-11-16 RX ADMIN — ACETAMINOPHEN 1000 MG: 500 TABLET ORAL at 20:40

## 2020-11-16 RX ADMIN — LIDOCAINE HYDROCHLORIDE 100 MG: 20 INJECTION, SOLUTION INFILTRATION; PERINEURAL at 07:24

## 2020-11-16 RX ADMIN — GLYCOPYRROLATE 0.4 MG: 0.2 INJECTION, SOLUTION INTRAMUSCULAR; INTRAVITREAL at 09:16

## 2020-11-16 RX ADMIN — FENTANYL CITRATE 100 MCG: 50 INJECTION, SOLUTION INTRAMUSCULAR; INTRAVENOUS at 07:24

## 2020-11-16 NOTE — PLAN OF CARE
Problem: Adult Inpatient Plan of Care  Goal: Plan of Care Review  Outcome: Ongoing, Progressing  Flowsheets (Taken 11/16/2020 1820)  Progress: improving  Plan of Care Reviewed With: patient  Outcome Summary: VSS, c/o pain, giving IV and PO pain medication, pt up to bathroom   Goal Outcome Evaluation:  Plan of Care Reviewed With: patient  Progress: improving  Outcome Summary: VSS, c/o pain, giving IV and PO pain medication, pt up to bathroom

## 2020-11-16 NOTE — ANESTHESIA POSTPROCEDURE EVALUATION
Patient: Candy Hernandez    Procedure Summary     Date: 11/16/20 Room / Location: [facility] OR [address] [place] [address] OR    Anesthesia Start: 0712 Anesthesia Stop: 0928    Procedure: VAGINAL HYSTERECTOMY and cystoscopy (N/A Uterus) Diagnosis:       Dysmenorrhea      Menorrhagia with irregular cycle      Pelvic pain      (Dysmenorrhea [N94.6])      (Menorrhagia with irregular cycle [N92.1])      (Pelvic pain [R10.2])    Surgeon: Saran Phillips DO Provider: Alfredo Koch MD    Anesthesia Type: general ASA Status: 3          Anesthesia Type: general    Vitals  Vitals Value Taken Time   BP     Temp 98.8 °F (37.1 °C) 11/16/20 0923   Pulse 59 11/16/20 0923   Resp 16 11/16/20 0923   SpO2             Post Anesthesia Care and Evaluation    Patient location during evaluation: PACU  Patient participation: complete - patient cannot participate  Level of consciousness: responsive to noxious stimuli  Pain score: 0  Pain management: adequate  Airway patency: patent  Anesthetic complications: No anesthetic complications  PONV Status: none  Cardiovascular status: acceptable and hemodynamically stable  Respiratory status: acceptable, nonlabored ventilation, spontaneous ventilation, face mask and oral airway  Hydration status: acceptable

## 2020-11-16 NOTE — ANESTHESIA PROCEDURE NOTES
Airway  Urgency: elective    Date/Time: 11/16/2020 7:28 AM  Airway not difficult    General Information and Staff    Patient location during procedure: OR  CRNA: Dina Velasquez CRNA    Indications and Patient Condition  Indications for airway management: airway protection    Preoxygenated: yes  Mask difficulty assessment: 0 - not attempted    Final Airway Details  Final airway type: endotracheal airway      Successful airway: ETT  Cuffed: yes   Successful intubation technique: direct laryngoscopy and RSI  Facilitating devices/methods: intubating stylet and cricoid pressure  Endotracheal tube insertion site: oral  Blade: Karolina  Blade size: 3  ETT size (mm): 7.0  Cormack-Lehane Classification: grade I - full view of glottis  Placement verified by: chest auscultation, capnometry and palpation of cuff   Cuff volume (mL): 7  Measured from: teeth  ETT/EBT  to teeth (cm): 22  Number of attempts at approach: 2  Assessment: lips, teeth, and gum same as pre-op and atraumatic intubation    Additional Comments  Initial attempt by RT student. Esophageal recognized. 2nd attempt by CRNA successful.   Mouth, lips, and teeth assessed and are unchanged from pre-op assessment

## 2020-11-16 NOTE — OP NOTE
OPERATIVE NOTE  Candy Hernandez  1975  11/16/2020    PREOP DIAGNOSES:  Dysmenorrhea [N94.6]  Menorrhagia with irregular cycle [N92.1]  Pelvic pain [R10.2]    POSTOP DIAGNOSES:  Post-Op Diagnosis Codes:     * Dysmenorrhea [N94.6]     * Menorrhagia with irregular cycle [N92.1]     * Pelvic pain [R10.2]    Procedure(s):  VAGINAL HYSTERECTOMY and cystoscopy    SURGEON: Saran Phillips DO FACOG    Assistant: Phylicia Yen CSA was responsible for performing the following activities: Retraction, Suction and Irrigation and their skilled assistance was necessary for the success of this case.     STAFF:   Circulator: Rex Carrillo RN; Radha Min RN  Scrub Person: Naty Reyes  Assistant: Phylicia Yen CSA    ANESTHESIA: Choice    ANESTHESIA STAFF:  Anesthesiologist: Alfredo Koch MD  CRNA: Dina Velasquez CRNA    ESTIMATED BLOOD LOSS: 50 ml     SPECIMEN:   ID Type Source Tests Collected by Time   1 (Not marked as sent) : stat Urine Urine, Catheter URINE CULTURE, PREGNANCY, URINE Saran Phillips DO 11/16/2020 0745   A (Not marked as sent) :  Tissue Uterus with Cervix TISSUE PATHOLOGY EXAM Saran Phillips DO 11/16/2020 0850       FINDINGS: Normal-appearing vagina and cervix, normal-appearing uterus unable to visualize tubes or ovaries. Normal-appearing bladder with reflux noted from bilateral ureteral orifices.    COMPLICATIONS: None    DESCRIPTION OF OPERATION:     After obtaining informed consent the patient was taken the operating room where general endotracheal anesthesia was found to be adequate she was then prepped and draped in the high lithotomy position. A final timeout was performed and preoperative antibiotics were given. A medium weighted speculum was placed in the Guzman retractor and the cervix was visualized the anterior and posterior lips of the cervix were grasped with thyroid Harish clamps. The cervix was then injected with 10 cc of 1% lidocaine with  epinephrine. The vaginal wall was then incised in 360 degrees using Bovie cautery. The vaginal wall was then dissected off of the cervix bluntly. The posterior peritoneum was identified grasped with Allis clamps and entered sharply with curved Mensah scissors. The posterior peritoneum was then tagged to the vaginal cuff. A Keila Auvard weighted speculum was then placed without difficulty. The bladder was then dissected off bluntly anteriorly. The uterosacral ligaments were then clamped and cut bilaterally using curved Cleveland's clamps and 0 Vicryl suture for ligation. The bladder was then dissected off further superiorly. The remainder of the left uterosacral ligament and part of the broad ligament was then clamped and cut using a Nanci clamp and then suture ligated using 0 Vicryl suture the same procedure was then performed on the right. The Guzman retractor was then removed and replaced with a curved [address] retractor. The anterior peritoneum was identified and entered sharply using Metzenbaum scissors. The uterine artery on the left was then clamped with a curved Nanci clamp suture-ligated using 0 Vicryl suture. The same procedure was then performed on the right. The remainder of the broad ligament on the left was then clamped cut and suture ligated using 0 Vicryl suture. The same procedure was then performed on the left. The right triple pedicle was then identified and clamped using a Nanci clamp and the triple pedicle was transected. The left triple pedicle was then suture-ligated using 0 Vicryl in a fore and aft stitch. The same procedure was then done on the right and the uterus and cervix were passed off the operative field. Inspection looking for bilateral fallopian tubes was difficult and they were not easily visualized. Likely these were removed at previous surgery. Three figure-of-eight sutures were placed on the posterior vaginal cuff and left uterosacral ligament to obtain hemostasis. Hemostasis was  noted. The vaginal cuff was then closed in a vertical fashion using 8-0 Vicryl interrupted sutures. A cystoscopy was then performed with reflux noted from bilateral ureteral orifices and no sutures noted in the bladder. A vaginal sweep was performed with nothing noted in the vagina. Patient tolerated the procedure well sponge lap and needle count were correct x2. The patient was taken to the recovery room in stable condition.          This document has been electronically signed by Saran Phillips DO on November 16, 2020 09:23 CST

## 2020-11-16 NOTE — ANESTHESIA PREPROCEDURE EVALUATION
Anesthesia Evaluation     no history of anesthetic complications:  NPO Solid Status: > 8 hours  NPO Liquid Status: > 8 hours           Airway   Mallampati: II  TM distance: >3 FB  Neck ROM: full  Possible difficult intubation and Small opening  Dental    (+) poor dentition        Pulmonary - normal exam    breath sounds clear to auscultation  (+) a smoker (0.5 ppd) Current, asthma,sleep apnea (does not use CPAP),   Cardiovascular - normal exam  Exercise tolerance: good (4-7 METS)    Rhythm: regular  Rate: normal    (-) hypertension, valvular problems/murmurs, dysrhythmias, angina, cardiac stents, DVT, hyperlipidemia      Neuro/Psych  (+) headaches (occ),     (-) seizures, TIA, CVA, numbness, psychiatric history  GI/Hepatic/Renal/Endo    (+) obesity,  GERD well controlled,    (-) hepatitis, liver disease, no renal disease, diabetes, no thyroid disorder    Musculoskeletal     Abdominal   (+) obese,    Substance History   (+) alcohol use (occ),   (-) drug use     OB/GYN    (-)  Pregnant        Other        (-) history of cancer  ROS/Med Hx Other: S/p bariatric surgery                  Anesthesia Plan    ASA 3     general   (Cole available)  intravenous induction     Anesthetic plan, all risks, benefits, and alternatives have been provided, discussed and informed consent has been obtained with: patient.  Use of blood products discussed with patient  Consented to blood products.

## 2020-11-17 VITALS
SYSTOLIC BLOOD PRESSURE: 108 MMHG | HEART RATE: 64 BPM | HEIGHT: 67 IN | TEMPERATURE: 97.8 F | BODY MASS INDEX: 35.12 KG/M2 | DIASTOLIC BLOOD PRESSURE: 58 MMHG | WEIGHT: 223.77 LBS | OXYGEN SATURATION: 100 % | RESPIRATION RATE: 20 BRPM

## 2020-11-17 LAB
DEPRECATED RDW RBC AUTO: 46.3 FL (ref 37–54)
ERYTHROCYTE [DISTWIDTH] IN BLOOD BY AUTOMATED COUNT: 13.6 % (ref 12.3–15.4)
HCT VFR BLD AUTO: 34.1 % (ref 34–46.6)
HGB BLD-MCNC: 10.9 G/DL (ref 12–15.9)
HOLD SPECIMEN: NORMAL
MCH RBC QN AUTO: 29.2 PG (ref 26.6–33)
MCHC RBC AUTO-ENTMCNC: 32 G/DL (ref 31.5–35.7)
MCV RBC AUTO: 91.4 FL (ref 79–97)
PLATELET # BLD AUTO: 205 10*3/MM3 (ref 140–450)
PMV BLD AUTO: 11.1 FL (ref 6–12)
RBC # BLD AUTO: 3.73 10*6/MM3 (ref 3.77–5.28)
WBC # BLD AUTO: 14.2 10*3/MM3 (ref 3.4–10.8)

## 2020-11-17 PROCEDURE — 99024 POSTOP FOLLOW-UP VISIT: CPT | Performed by: OBSTETRICS & GYNECOLOGY

## 2020-11-17 PROCEDURE — 85027 COMPLETE CBC AUTOMATED: CPT | Performed by: OBSTETRICS & GYNECOLOGY

## 2020-11-17 RX ORDER — OXYCODONE AND ACETAMINOPHEN 10; 325 MG/1; MG/1
1 TABLET ORAL EVERY 6 HOURS PRN
Qty: 20 TABLET | Refills: 0 | Status: SHIPPED | OUTPATIENT
Start: 2020-11-17 | End: 2020-11-24 | Stop reason: SDUPTHER

## 2020-11-17 RX ORDER — OXYCODONE HCL 5 MG/5 ML
10 SOLUTION, ORAL ORAL EVERY 4 HOURS PRN
Status: DISCONTINUED | OUTPATIENT
Start: 2020-11-17 | End: 2020-11-17 | Stop reason: HOSPADM

## 2020-11-17 RX ORDER — POLYETHYLENE GLYCOL 3350 17 G/17G
17 POWDER, FOR SOLUTION ORAL DAILY
Qty: 72 PACKET | Refills: 2 | Status: SHIPPED | OUTPATIENT
Start: 2020-11-17

## 2020-11-17 RX ADMIN — OXYCODONE HYDROCHLORIDE 10 MG: 5 SOLUTION ORAL at 05:33

## 2020-11-17 RX ADMIN — OXYCODONE 10 MG: 5 TABLET ORAL at 01:17

## 2020-11-17 RX ADMIN — OXYCODONE HYDROCHLORIDE 10 MG: 5 SOLUTION ORAL at 09:54

## 2020-11-17 RX ADMIN — POLYETHYLENE GLYCOL 3350 17 G: 17 POWDER, FOR SOLUTION ORAL at 09:54

## 2020-11-17 RX ADMIN — ACETAMINOPHEN 1000 MG: 500 TABLET ORAL at 04:07

## 2020-11-17 NOTE — PROGRESS NOTES
[facility]  Candy Hernandez  : 1975  MRN: [id number]  CSN: [id number]    Post-operative Day #1  Subjective   Her pain is well controlled.  Vaginal bleeding is essentially absent.  She is passing gas and has not had a bowel movement.  No fevers, tolerating regular diet, ambulating without difficulty.     Objective     Min/max vitals past 24 hours:   Temp  Min: 97 °F (36.1 °C)  Max: 98.8 °F (37.1 °C)  BP  Min: 97/47  Max: 124/67  Pulse  Min: 59  Max: 92  Pulse  Min: 59  Max: 92        General: well developed; well nourished  no acute distress   Abdomen: soft, non-tender; no masses   Pelvic: Not performed   Ext: Calves NT     Lab Results   Component Value Date    WBC 14.20 (H) 2020    HGB 10.9 (L) 2020    HCT 34.1 2020    MCV 91.4 2020     2020    RH Positive 2020        Assessment   1. S/P total vaginal hysterectomy, doing well recovering appropriately with appropriate drop in H&H.  Vital signs are stable.     Plan   1. Continue routine post-operative care  2. Ambulate  3. Discharge to home  4. Advised no tampons or intercourse for 6 weeks.  5. D/C questions all answered  6. Follow-up appointment in 1 week(s)      This document has been electronically signed by Saran Phillips DO on 2020 07:29 CST

## 2020-11-17 NOTE — DISCHARGE SUMMARY
[facility]  Candy Hernandez  : 1975  MRN: [id number]  CSN: [id number]    Discharge Summary      Date of Admission: 2020   Date of Discharge: 2020   Discharge Diagnosis: 1.  Status post uncomplicated vaginal hysterectomy due to pelvic pain and bleeding   Procedures Performed: Procedure(s):  VAGINAL HYSTERECTOMY and cystoscopy      Brief History: Patient is a 45 y.o.who presented for scheduled hysterectomy.  Surgery was uncomplicated and postpartum course was uncomplicated..   Hospital Course: Her hospital course has been uneventful.  Today, on postoperative day # 1, she is tolerating a regular diet, ambulating without assistance, and desires to go home.  [place] catheter was removed this morning, and she has urinated without difficulty. She has had no fever, and her pain is controlled.  She has had no nausea or vomiting.   Pending Studies: Tissue Pathology   Condition at Discharge: Stable   Discharge Diet: Diet Instructions     Diet: Regular      Discharge Diet: Regular         Discharge Activity: Activity Instructions     Bathing Restrictions      Type of Restriction: Bathing    Bathing Restrictions: Other    Explain Bathing Restrictions: No soaking in bathtub for 4 weeks, showers are fine.    Driving Restrictions      Type of Restriction: Driving    Driving Restrictions: No Driving (Time Limited)    Length: Other    Indicate Length of Restriction: No driving for 1 week or while on narcotic pain medications. Riding is car is fine.    Lifting Restrictions      Type of Restriction: Lifting    Lifting Restrictions: Other    Explain Lifing Restrictions: No lifting greater than 15 pound until cleared by surgeon.    Pelvic Rest      Nothing in the vagina for 6 weeks to include tampons or intercourse.    Sexual Activity Restrictions      Type of Restriction: Sex    Explain Sexual Activity Restrictions: No sexual intercourse for at least 6 weeks         Discharge Medications:    Your medication  list      START taking these medications      Instructions Last Dose Given Next Dose Due   oxyCODONE-acetaminophen  MG per tablet  Commonly known as: Percocet      Take 1 tablet by mouth Every 6 (Six) Hours As Needed for Moderate Pain .       polyethylene glycol 17 g packet  Commonly known as: MIRALAX      Take 17 g by mouth Daily.          CONTINUE taking these medications      Instructions Last Dose Given Next Dose Due   albuterol sulfate  (90 Base) MCG/ACT inhaler  Commonly known as: PROVENTIL HFA;VENTOLIN HFA;PROAIR HFA      Inhale 2 puffs Every 4 (Four) Hours As Needed for Wheezing or Shortness of Air.       B-12 1000 MCG sublingual tablet      Place 1,000 mcg under the tongue Daily.       cholecalciferol 25 MCG (1000 UT) tablet  Commonly known as: VITAMIN D3      Take 1,000 Units by mouth Daily.       folic acid 1 MG tablet  Commonly known as: FOLVITE      Take 1 tablet by mouth Daily.       multivitamin tablet tablet  Commonly known as: THERAGRAN      Take 1 tablet by mouth Daily.             Where to Get Your Medications      These medications were sent to [facility] [place] [id number]    Hours: Monday through Friday 7:00am to 5:00pm Phone: [phone] ·   oxyCODONE-acetaminophen  MG per tablet  · polyethylene glycol 17 g packet        Discharge Disposition: home   Follow-up: Future Appointments   Date Time Provider Department Center   12/21/2020 11:00 AM Alesha Edwards APRN [facility]            This note has been electronically signed.    Saran Phillips,   November 17, 2020  07:35 CST

## 2020-11-17 NOTE — PLAN OF CARE
Problem: Adult Inpatient Plan of Care  Goal: Plan of Care Review  Outcome: Ongoing, Progressing  Flowsheets  Taken 11/17/2020 0419 by Nestor Campbell, RN  Progress: improving  Outcome Summary: vss, c/o pain well controlled with po pain meds, iv antibiotics given, pt ambulating and voiding well.  Taken 11/16/2020 1619 by Zuly Dao RN  Plan of Care Reviewed With: patient   Goal Outcome Evaluation:  Plan of Care Reviewed With: patient  Progress: improving  Outcome Summary: vss, c/o pain well controlled with po pain meds, iv antibiotics given, pt ambulating and voiding well.

## 2020-11-18 LAB — BACTERIA SPEC AEROBE CULT: ABNORMAL

## 2020-11-19 LAB
LAB AP CASE REPORT: NORMAL
PATH REPORT.FINAL DX SPEC: NORMAL

## 2020-11-24 ENCOUNTER — OFFICE VISIT (OUTPATIENT)
Dept: OBSTETRICS AND GYNECOLOGY | Facility: CLINIC | Age: 45
End: 2020-11-24

## 2020-11-24 VITALS
BODY MASS INDEX: 35.47 KG/M2 | HEIGHT: 67 IN | DIASTOLIC BLOOD PRESSURE: 88 MMHG | WEIGHT: 226 LBS | SYSTOLIC BLOOD PRESSURE: 142 MMHG

## 2020-11-24 DIAGNOSIS — Z90.710 STATUS POST VAGINAL HYSTERECTOMY: Primary | ICD-10-CM

## 2020-11-24 DIAGNOSIS — N94.6 DYSMENORRHEA: ICD-10-CM

## 2020-11-24 DIAGNOSIS — R10.2 PELVIC PAIN: ICD-10-CM

## 2020-11-24 DIAGNOSIS — Z09 POSTOPERATIVE FOLLOW-UP: ICD-10-CM

## 2020-11-24 DIAGNOSIS — N92.1 MENORRHAGIA WITH IRREGULAR CYCLE: ICD-10-CM

## 2020-11-24 PROCEDURE — 99024 POSTOP FOLLOW-UP VISIT: CPT | Performed by: OBSTETRICS & GYNECOLOGY

## 2020-11-24 RX ORDER — OXYCODONE AND ACETAMINOPHEN 10; 325 MG/1; MG/1
1 TABLET ORAL EVERY 6 HOURS PRN
Qty: 20 TABLET | Refills: 0 | Status: SHIPPED | OUTPATIENT
Start: 2020-11-24 | End: 2020-12-01 | Stop reason: SDUPTHER

## 2020-11-24 NOTE — PROGRESS NOTES
Chief complaint: Postoperative follow-up    Patient presents status post vaginal hysterectomy approximately 1 week ago.  Overall is doing well.  Is having some pelvic pressure and urinary frequency but no pain or burning with urination.  She is ambulating without difficulty, denies any fevers at this time.  Is still having some pain and is requesting pain medication refill which I felt was reasonable at this time.  Normal diet, normal bowel movements.    Vital signs reviewed  Awake and oriented x3  No acute distress  Abdomen appropriately tender no guarding or rebound    Patient is overall doing well at this time.  Recovering appropriately.  Plan for patient to return to see me in 4 weeks, sooner as needed.  Will refill pain medications today.  Patient to return sooner as needed.

## 2020-12-01 ENCOUNTER — TELEPHONE (OUTPATIENT)
Dept: OBSTETRICS AND GYNECOLOGY | Facility: CLINIC | Age: 45
End: 2020-12-01

## 2020-12-01 DIAGNOSIS — N94.6 DYSMENORRHEA: ICD-10-CM

## 2020-12-01 DIAGNOSIS — N92.1 MENORRHAGIA WITH IRREGULAR CYCLE: ICD-10-CM

## 2020-12-01 DIAGNOSIS — Z90.710 STATUS POST VAGINAL HYSTERECTOMY: ICD-10-CM

## 2020-12-01 DIAGNOSIS — R10.2 PELVIC PAIN: ICD-10-CM

## 2020-12-01 RX ORDER — OXYCODONE AND ACETAMINOPHEN 10; 325 MG/1; MG/1
1 TABLET ORAL EVERY 6 HOURS PRN
Qty: 15 TABLET | Refills: 0 | Status: SHIPPED | OUTPATIENT
Start: 2020-12-01 | End: 2021-01-07

## 2020-12-01 NOTE — PROGRESS NOTES
Patient called requesting additional pain medication.  Has still continued to have pain although is 2 weeks out from surgery.  Will refill pain medication at this time however would not continue to fill pain medication as she will be almost 3 8 weeks out from surgery and pain should be improving.

## 2020-12-01 NOTE — TELEPHONE ENCOUNTER
Call and inform patient that pain medication has been put in.  This will be the last time that I refill pain medication, will be happy to refill ibuprofen in the future but will not continue to refill narcotics after this point.

## 2020-12-02 DIAGNOSIS — Z87.09 HISTORY OF ASTHMA: ICD-10-CM

## 2020-12-02 RX ORDER — ALBUTEROL SULFATE 90 UG/1
AEROSOL, METERED RESPIRATORY (INHALATION)
Qty: 8.5 G | Refills: 1 | Status: SHIPPED | OUTPATIENT
Start: 2020-12-02

## 2020-12-03 DIAGNOSIS — Z87.09 HISTORY OF ASTHMA: ICD-10-CM

## 2020-12-03 RX ORDER — ALBUTEROL SULFATE 90 UG/1
2 AEROSOL, METERED RESPIRATORY (INHALATION) EVERY 4 HOURS PRN
Qty: 8.5 G | Refills: 1 | OUTPATIENT
Start: 2020-12-03

## 2020-12-18 ENCOUNTER — TELEPHONE (OUTPATIENT)
Dept: FAMILY MEDICINE CLINIC | Facility: CLINIC | Age: 45
End: 2020-12-18

## 2021-01-07 ENCOUNTER — OFFICE VISIT (OUTPATIENT)
Dept: OBSTETRICS AND GYNECOLOGY | Facility: CLINIC | Age: 46
End: 2021-01-07

## 2021-01-07 VITALS
BODY MASS INDEX: 35.19 KG/M2 | WEIGHT: 224.2 LBS | SYSTOLIC BLOOD PRESSURE: 134 MMHG | HEIGHT: 67 IN | DIASTOLIC BLOOD PRESSURE: 78 MMHG

## 2021-01-07 DIAGNOSIS — Z90.710 STATUS POST VAGINAL HYSTERECTOMY: ICD-10-CM

## 2021-01-07 DIAGNOSIS — Z09 POSTOPERATIVE FOLLOW-UP: Primary | ICD-10-CM

## 2021-01-07 PROCEDURE — 99024 POSTOP FOLLOW-UP VISIT: CPT | Performed by: OBSTETRICS & GYNECOLOGY

## 2021-01-07 NOTE — PROGRESS NOTES
Chief complaint: Postoperative follow-up    Patient presents for follow-up status post vaginal hysterectomy approximately 7 weeks ago.  Patient is overall doing well without complaints.  She is ambulating without difficulty tolerating a regular diet.  Normal bowel and bladder habits at this time.  She is denies any pain at this time is having a little discharge but no itching or burning does not feel that this is worrisome to her at this time    Vital signs reviewed  Awake and oriented x3  No acute distress    Patient doing well and recovering appropriately at this time may return back to normal activity at this time.  Return to see me in 1 year for well woman exam return sooner as needed.

## 2021-02-12 ENCOUNTER — TELEPHONE (OUTPATIENT)
Dept: OBSTETRICS AND GYNECOLOGY | Facility: CLINIC | Age: 46
End: 2021-02-12

## 2021-02-12 NOTE — TELEPHONE ENCOUNTER
Pt had hyst in November..having a lot of hot flashes .Can she get something to help.the patient no [phone].the patient knows it may be Tuesday..warned abt weather..

## 2021-02-15 RX ORDER — ESTRADIOL 1 MG/1
0.5 TABLET ORAL DAILY
Qty: 90 TABLET | Refills: 4 | Status: SHIPPED | OUTPATIENT
Start: 2021-02-15

## 2021-02-15 NOTE — TELEPHONE ENCOUNTER
Called to notify patient script was sent to pharmacy and she needs to follow up in 6 weeks to see how medication is working out for her.

## 2021-02-22 ENCOUNTER — TELEPHONE (OUTPATIENT)
Dept: OBSTETRICS AND GYNECOLOGY | Facility: CLINIC | Age: 46
End: 2021-02-22

## 2021-02-22 NOTE — TELEPHONE ENCOUNTER
Pt called and said the pharmacy keeps telling her they don't have her script for estradiol. I told her we did send it. I explained it might need a PA or might not be covered by her insurance. I told her I would send a message. It would be closer to the end of the day before you would be out of surgery.      Thanks

## (undated) DEVICE — GLV SURG SIGNATURE ESSENTIAL PF LTX SZ6

## (undated) DEVICE — PK LAP GYN 60

## (undated) DEVICE — GOWN,PREVENTION PLUS,XLNG/XXLARGE,STRL: Brand: MEDLINE

## (undated) DEVICE — STERILE POLYISOPRENE POWDER-FREE SURGICAL GLOVES WITH EMOLLIENT COATING: Brand: PROTEXIS

## (undated) DEVICE — GLV SURG NEOLON 2G PF LF 6.5 STRL

## (undated) DEVICE — CONTAINER,SPECIMEN,OR STERILE,4OZ: Brand: MEDLINE

## (undated) DEVICE — GLV SURG SENSICARE POLYISPRN W/ALOE PF LF 6.5 GRN STRL

## (undated) DEVICE — GLV SURG SIGNATURE ESSENTIAL PF LTX SZ7.5

## (undated) DEVICE — SYRINGE,TOOMEY,IRRIGATION,70CC,STERILE: Brand: MEDLINE

## (undated) DEVICE — SUT VICRYL O M0-4 27IN ETHCPP71D

## (undated) DEVICE — SUT VIC 2/0 SH 27IN

## (undated) DEVICE — CYSTO/BLADDER IRRIGATION SET, REGULATING CLAMP

## (undated) DEVICE — TP SXN YANKR BLB TIP W/TBG 10F LF STRL

## (undated) DEVICE — SHEET,DRAPE,53X77,STERILE: Brand: MEDLINE

## (undated) DEVICE — GLV SURG SIGNATURE ESSENTIAL PF LTX SZ6.5

## (undated) DEVICE — DRP SURG U/BUTT W/PCH BACK STRL

## (undated) DEVICE — PENCL ES MEGADINE EZ/CLEAN BUTN W/HOLSTR 10FT

## (undated) DEVICE — KT BRST TISS EXPANDR CUSTOM FILL

## (undated) DEVICE — CATHETER,URETHRAL,REDRUBBER,STRL,16FR: Brand: MEDLINE

## (undated) DEVICE — TRY IRR